# Patient Record
Sex: MALE | Race: WHITE | Employment: FULL TIME | URBAN - METROPOLITAN AREA
[De-identification: names, ages, dates, MRNs, and addresses within clinical notes are randomized per-mention and may not be internally consistent; named-entity substitution may affect disease eponyms.]

---

## 2017-01-08 ENCOUNTER — APPOINTMENT (OUTPATIENT)
Dept: GENERAL RADIOLOGY | Age: 32
End: 2017-01-08
Attending: NURSE PRACTITIONER
Payer: COMMERCIAL

## 2017-01-08 ENCOUNTER — HOSPITAL ENCOUNTER (OUTPATIENT)
Age: 32
Discharge: HOME | End: 2017-01-08
Payer: COMMERCIAL

## 2017-01-08 VITALS
RESPIRATION RATE: 16 BRPM | OXYGEN SATURATION: 98 % | DIASTOLIC BLOOD PRESSURE: 87 MMHG | HEIGHT: 74 IN | TEMPERATURE: 98 F | HEART RATE: 85 BPM | BODY MASS INDEX: 32.08 KG/M2 | WEIGHT: 250 LBS | SYSTOLIC BLOOD PRESSURE: 129 MMHG

## 2017-01-08 DIAGNOSIS — S46.911A SHOULDER STRAIN, RIGHT, INITIAL ENCOUNTER: Primary | ICD-10-CM

## 2017-01-08 DIAGNOSIS — H10.31 ACUTE BACTERIAL CONJUNCTIVITIS OF RIGHT EYE: ICD-10-CM

## 2017-01-08 PROCEDURE — 99214 OFFICE O/P EST MOD 30 MIN: CPT

## 2017-01-08 PROCEDURE — 99213 OFFICE O/P EST LOW 20 MIN: CPT

## 2017-01-08 PROCEDURE — 73030 X-RAY EXAM OF SHOULDER: CPT

## 2017-01-08 RX ORDER — TOBRAMYCIN 3 MG/ML
2 SOLUTION/ DROPS OPHTHALMIC EVERY 6 HOURS
Qty: 1 BOTTLE | Refills: 0 | Status: SHIPPED | OUTPATIENT
Start: 2017-01-08 | End: 2017-01-15

## 2017-01-08 NOTE — ED PROVIDER NOTES
Patient presents with: Eye Visual Problem (opthalmic)      HPI:     Hugh Pichardo is a 33year old male who presents today with a chief complaint of right eye redness and drainage that started yesterday.   Since he has had a one-week history of URI symp equivalent per week         Comment: socially    Drug Use: No    Sexual Activity: Not on file   Not on file  Other Topics Concern    Caffeine Concern Yes    Comment: coffee, 2 cups daily    Left Handed No    Right Handed Yes    Currently spends a great venancio Question: What is the Relevant Clinical Indication / Reason for Exam?  Answer: possible conjunctivitis, right shoulder pain  Tobramycin Sulfate 0.3 % Ophthalmic Solution   Sig: Place 2 drops into the right eye every 6 (six) hours.    Dispense:  1 Bottle   R

## 2017-03-02 RX ORDER — LANSOPRAZOLE 30 MG/1
CAPSULE, DELAYED RELEASE ORAL
Qty: 90 CAPSULE | Refills: 0 | Status: SHIPPED | OUTPATIENT
Start: 2017-03-02 | End: 2017-05-25

## 2017-05-25 RX ORDER — LANSOPRAZOLE 30 MG/1
CAPSULE, DELAYED RELEASE ORAL
Qty: 90 CAPSULE | Refills: 3 | Status: SHIPPED | OUTPATIENT
Start: 2017-05-25 | End: 2017-07-25 | Stop reason: ALTCHOICE

## 2017-07-19 ENCOUNTER — TELEPHONE (OUTPATIENT)
Dept: FAMILY MEDICINE CLINIC | Facility: CLINIC | Age: 32
End: 2017-07-19

## 2017-07-25 ENCOUNTER — OFFICE VISIT (OUTPATIENT)
Dept: FAMILY MEDICINE CLINIC | Facility: CLINIC | Age: 32
End: 2017-07-25

## 2017-07-25 VITALS
DIASTOLIC BLOOD PRESSURE: 84 MMHG | WEIGHT: 244 LBS | HEART RATE: 90 BPM | SYSTOLIC BLOOD PRESSURE: 129 MMHG | BODY MASS INDEX: 31.32 KG/M2 | HEIGHT: 74 IN

## 2017-07-25 DIAGNOSIS — L72.9 SCALP CYST: Primary | ICD-10-CM

## 2017-07-25 DIAGNOSIS — S46.002A INJURY OF LEFT ROTATOR CUFF, INITIAL ENCOUNTER: ICD-10-CM

## 2017-07-25 DIAGNOSIS — M79.641 RIGHT HAND PAIN: ICD-10-CM

## 2017-07-25 PROCEDURE — 99212 OFFICE O/P EST SF 10 MIN: CPT | Performed by: FAMILY MEDICINE

## 2017-07-25 PROCEDURE — 99213 OFFICE O/P EST LOW 20 MIN: CPT | Performed by: FAMILY MEDICINE

## 2017-07-25 RX ORDER — LIDOCAINE HYDROCHLORIDE 10 MG/ML
4 INJECTION, SOLUTION INFILTRATION; PERINEURAL ONCE
Status: DISCONTINUED | OUTPATIENT
Start: 2017-07-25 | End: 2017-11-21

## 2017-07-25 RX ORDER — TRIAMCINOLONE ACETONIDE 40 MG/ML
40 INJECTION, SUSPENSION INTRA-ARTICULAR; INTRAMUSCULAR ONCE
Status: DISCONTINUED | OUTPATIENT
Start: 2017-07-25 | End: 2017-11-29 | Stop reason: ALTCHOICE

## 2017-07-25 RX ORDER — BUPROPION HYDROCHLORIDE 150 MG/1
150 TABLET, EXTENDED RELEASE ORAL 2 TIMES DAILY
Qty: 60 TABLET | Refills: 2 | Status: SHIPPED | OUTPATIENT
Start: 2017-07-25 | End: 2017-10-28

## 2017-07-25 RX ORDER — NAPROXEN 500 MG/1
500 TABLET ORAL 2 TIMES DAILY WITH MEALS
Qty: 60 TABLET | Refills: 3 | Status: SHIPPED | OUTPATIENT
Start: 2017-07-25 | End: 2018-07-30

## 2017-07-25 NOTE — PROGRESS NOTES
Blood pressure 129/84, pulse 90, height 6' 2\" (1.88 m), weight 244 lb (110.7 kg). Complaining of a lump on his head. He has only had it for a few days. Denies trauma. Also with continued right hand pain.   He also reports that he has left shoulder pa

## 2017-07-25 NOTE — PROCEDURES
INFORMED CONSENT obtained all questions answered written consent obtained injection using aseptic technique left shoulder posterior approach Kenalog 40 mg/mL ×1 cc and lidocaine 1% ×4 mL patient tolerated procedure well immediate relief obtained

## 2017-08-22 ENCOUNTER — OFFICE VISIT (OUTPATIENT)
Dept: FAMILY MEDICINE CLINIC | Facility: CLINIC | Age: 32
End: 2017-08-22

## 2017-08-22 VITALS
HEIGHT: 74 IN | WEIGHT: 244 LBS | DIASTOLIC BLOOD PRESSURE: 77 MMHG | HEART RATE: 86 BPM | BODY MASS INDEX: 31.32 KG/M2 | SYSTOLIC BLOOD PRESSURE: 121 MMHG

## 2017-08-22 DIAGNOSIS — H53.8 BLURRED VISION, LEFT EYE: Primary | ICD-10-CM

## 2017-08-22 PROCEDURE — 99212 OFFICE O/P EST SF 10 MIN: CPT | Performed by: FAMILY MEDICINE

## 2017-08-22 PROCEDURE — 99213 OFFICE O/P EST LOW 20 MIN: CPT | Performed by: FAMILY MEDICINE

## 2017-08-22 NOTE — PROGRESS NOTES
Blood pressure 121/77, pulse 86, height 6' 2\" (1.88 m), weight 244 lb (110.7 kg). Patient presents today status post facial trauma 10 days ago. He was using a pair of clippers and hit himself in the face.   Reports he has been wearing his contact lenses

## 2017-10-30 RX ORDER — BUPROPION HYDROCHLORIDE 150 MG/1
TABLET, EXTENDED RELEASE ORAL
Qty: 180 TABLET | Refills: 3 | Status: SHIPPED | OUTPATIENT
Start: 2017-10-30 | End: 2018-05-22

## 2017-11-17 ENCOUNTER — APPOINTMENT (OUTPATIENT)
Dept: CT IMAGING | Facility: HOSPITAL | Age: 32
End: 2017-11-17
Attending: EMERGENCY MEDICINE
Payer: COMMERCIAL

## 2017-11-17 ENCOUNTER — HOSPITAL ENCOUNTER (EMERGENCY)
Facility: HOSPITAL | Age: 32
Discharge: HOME | End: 2017-11-17
Attending: EMERGENCY MEDICINE
Payer: COMMERCIAL

## 2017-11-17 VITALS
DIASTOLIC BLOOD PRESSURE: 88 MMHG | OXYGEN SATURATION: 96 % | RESPIRATION RATE: 20 BRPM | BODY MASS INDEX: 32.08 KG/M2 | SYSTOLIC BLOOD PRESSURE: 130 MMHG | HEIGHT: 74 IN | TEMPERATURE: 98 F | HEART RATE: 80 BPM | WEIGHT: 250 LBS

## 2017-11-17 DIAGNOSIS — N20.0 RIGHT NEPHROLITHIASIS: Primary | ICD-10-CM

## 2017-11-17 PROCEDURE — 74177 CT ABD & PELVIS W/CONTRAST: CPT | Performed by: EMERGENCY MEDICINE

## 2017-11-17 PROCEDURE — 96361 HYDRATE IV INFUSION ADD-ON: CPT

## 2017-11-17 PROCEDURE — 83690 ASSAY OF LIPASE: CPT | Performed by: EMERGENCY MEDICINE

## 2017-11-17 PROCEDURE — 85025 COMPLETE CBC W/AUTO DIFF WBC: CPT | Performed by: EMERGENCY MEDICINE

## 2017-11-17 PROCEDURE — 96376 TX/PRO/DX INJ SAME DRUG ADON: CPT

## 2017-11-17 PROCEDURE — 81001 URINALYSIS AUTO W/SCOPE: CPT | Performed by: EMERGENCY MEDICINE

## 2017-11-17 PROCEDURE — 96374 THER/PROPH/DIAG INJ IV PUSH: CPT

## 2017-11-17 PROCEDURE — 80053 COMPREHEN METABOLIC PANEL: CPT | Performed by: EMERGENCY MEDICINE

## 2017-11-17 PROCEDURE — 96375 TX/PRO/DX INJ NEW DRUG ADDON: CPT

## 2017-11-17 PROCEDURE — 99284 EMERGENCY DEPT VISIT MOD MDM: CPT

## 2017-11-17 RX ORDER — ONDANSETRON 4 MG/1
4 TABLET, ORALLY DISINTEGRATING ORAL EVERY 8 HOURS PRN
Qty: 15 TABLET | Refills: 0 | Status: SHIPPED | OUTPATIENT
Start: 2017-11-17 | End: 2017-11-21

## 2017-11-17 RX ORDER — TRAMADOL HYDROCHLORIDE 50 MG/1
50 TABLET ORAL EVERY 8 HOURS PRN
Qty: 15 TABLET | Refills: 0 | Status: SHIPPED | OUTPATIENT
Start: 2017-11-17 | End: 2017-11-21

## 2017-11-17 RX ORDER — ALFUZOSIN HYDROCHLORIDE 10 MG/1
10 TABLET, EXTENDED RELEASE ORAL ONCE
Status: COMPLETED | OUTPATIENT
Start: 2017-11-17 | End: 2017-11-17

## 2017-11-17 RX ORDER — MORPHINE SULFATE 4 MG/ML
4 INJECTION, SOLUTION INTRAMUSCULAR; INTRAVENOUS ONCE
Status: COMPLETED | OUTPATIENT
Start: 2017-11-17 | End: 2017-11-17

## 2017-11-17 RX ORDER — MELOXICAM 7.5 MG/1
7.5 TABLET ORAL DAILY
Qty: 14 TABLET | Refills: 0 | Status: SHIPPED | OUTPATIENT
Start: 2017-11-17 | End: 2017-11-21

## 2017-11-17 RX ORDER — KETOROLAC TROMETHAMINE 30 MG/ML
30 INJECTION, SOLUTION INTRAMUSCULAR; INTRAVENOUS ONCE
Status: COMPLETED | OUTPATIENT
Start: 2017-11-17 | End: 2017-11-17

## 2017-11-17 RX ORDER — ONDANSETRON 2 MG/ML
4 INJECTION INTRAMUSCULAR; INTRAVENOUS ONCE
Status: DISCONTINUED | OUTPATIENT
Start: 2017-11-17 | End: 2017-11-17

## 2017-11-17 RX ORDER — ONDANSETRON 2 MG/ML
4 INJECTION INTRAMUSCULAR; INTRAVENOUS ONCE
Status: COMPLETED | OUTPATIENT
Start: 2017-11-17 | End: 2017-11-17

## 2017-11-17 RX ORDER — TAMSULOSIN HYDROCHLORIDE 0.4 MG/1
0.4 CAPSULE ORAL DAILY
Qty: 14 CAPSULE | Refills: 0 | Status: SHIPPED | OUTPATIENT
Start: 2017-11-17 | End: 2017-11-27

## 2017-11-17 NOTE — ED INITIAL ASSESSMENT (HPI)
Patient complains of R sided abd pain x3 days, states he is \"gassy\", states he is nauseous, states pain radiates to R flank at times

## 2017-11-18 NOTE — ED PROVIDER NOTES
Patient Seen in: [facility] AND [facility] Emergency Department    History   Patient presents with:  Abdomen/Flank Pain (GI/)    Stated Complaint: ABD Pain     HPI    26 yo M without PMH/PSH presenting with three days of sharp/cramping right sided abdominal disc Positive for stated complaint: ABD Pain  Other systems are as noted in HPI. Constitutional and vital signs reviewed. All other systems reviewed and negative except as noted above.     PSFH elements reviewed from today and agreed except as otherwis Pelvis Iv Contrast, No Oral (er)    Result Date: 11/17/2017  PROCEDURE: CT ABDOMEN PELVIS IV CONTRAST NO ORAL (ER)  COMPARISON: [facility], Bennie Yasmany 12, 8/31/2014, 15:28. INDICATIONS: RLQ abdomen pain x3 days.   TECHNIQUE: CT imag 13, 2012. 3. Normal appendix. MDM     DIFFERENTIAL DIAGNOSIS: After history and physical exam differential diagnosis was considered for appendicitis, biliary colic, renal colic, colitis, pyelonephritis.     Pulse ox: 98%:Normal on RA, as interpreted

## 2017-11-21 ENCOUNTER — HOSPITAL ENCOUNTER (OUTPATIENT)
Dept: GENERAL RADIOLOGY | Facility: HOSPITAL | Age: 32
Discharge: HOME | End: 2017-11-21
Attending: UROLOGY
Payer: COMMERCIAL

## 2017-11-21 ENCOUNTER — OFFICE VISIT (OUTPATIENT)
Dept: FAMILY MEDICINE CLINIC | Facility: CLINIC | Age: 32
End: 2017-11-21

## 2017-11-21 ENCOUNTER — OFFICE VISIT (OUTPATIENT)
Dept: SURGERY | Facility: CLINIC | Age: 32
End: 2017-11-21

## 2017-11-21 VITALS
TEMPERATURE: 98 F | HEART RATE: 91 BPM | SYSTOLIC BLOOD PRESSURE: 147 MMHG | BODY MASS INDEX: 32.32 KG/M2 | HEIGHT: 74 IN | WEIGHT: 251.81 LBS | DIASTOLIC BLOOD PRESSURE: 83 MMHG

## 2017-11-21 VITALS
BODY MASS INDEX: 32.08 KG/M2 | SYSTOLIC BLOOD PRESSURE: 135 MMHG | TEMPERATURE: 98 F | WEIGHT: 250 LBS | HEIGHT: 74 IN | HEART RATE: 85 BPM | DIASTOLIC BLOOD PRESSURE: 92 MMHG

## 2017-11-21 DIAGNOSIS — N20.0 KIDNEY STONE: ICD-10-CM

## 2017-11-21 DIAGNOSIS — N20.1 URETERAL STONE: ICD-10-CM

## 2017-11-21 DIAGNOSIS — N13.2 HYDRONEPHROSIS WITH URINARY OBSTRUCTION DUE TO URETERAL CALCULUS: ICD-10-CM

## 2017-11-21 DIAGNOSIS — N20.1 URETERAL STONE: Primary | ICD-10-CM

## 2017-11-21 DIAGNOSIS — N20.0 NEPHROLITHIASIS: Primary | ICD-10-CM

## 2017-11-21 DIAGNOSIS — R31.29 MICROHEMATURIA: ICD-10-CM

## 2017-11-21 DIAGNOSIS — N23 RENAL COLIC ON RIGHT SIDE: ICD-10-CM

## 2017-11-21 PROCEDURE — 99212 OFFICE O/P EST SF 10 MIN: CPT | Performed by: UROLOGY

## 2017-11-21 PROCEDURE — 99213 OFFICE O/P EST LOW 20 MIN: CPT | Performed by: FAMILY MEDICINE

## 2017-11-21 PROCEDURE — 99212 OFFICE O/P EST SF 10 MIN: CPT | Performed by: FAMILY MEDICINE

## 2017-11-21 PROCEDURE — 99244 OFF/OP CNSLTJ NEW/EST MOD 40: CPT | Performed by: UROLOGY

## 2017-11-21 PROCEDURE — 74020 XR ABDOMEN MINIMUM 2 VIEWS (CPT=74020): CPT | Performed by: UROLOGY

## 2017-11-21 RX ORDER — SODIUM CHLORIDE, SODIUM LACTATE, POTASSIUM CHLORIDE, CALCIUM CHLORIDE 600; 310; 30; 20 MG/100ML; MG/100ML; MG/100ML; MG/100ML
INJECTION, SOLUTION INTRAVENOUS CONTINUOUS
Status: CANCELLED | OUTPATIENT
Start: 2017-11-21

## 2017-11-21 RX ORDER — ONDANSETRON 4 MG/1
TABLET, ORALLY DISINTEGRATING ORAL
Qty: 25 TABLET | Refills: 0 | Status: SHIPPED | OUTPATIENT
Start: 2017-11-21 | End: 2017-12-12

## 2017-11-21 RX ORDER — HYDROCODONE BITARTRATE AND ACETAMINOPHEN 5; 325 MG/1; MG/1
TABLET ORAL
Qty: 30 TABLET | Refills: 0 | Status: SHIPPED | OUTPATIENT
Start: 2017-11-21 | End: 2017-12-12

## 2017-11-21 RX ORDER — MELOXICAM 7.5 MG/1
7.5 TABLET ORAL DAILY
Qty: 14 TABLET | Refills: 0 | OUTPATIENT
Start: 2017-11-21 | End: 2017-12-05

## 2017-11-21 NOTE — PROGRESS NOTES
Blood pressure 147/83, pulse 91, temperature 98.1 °F (36.7 °C), temperature source Oral, height 6' 2\" (1.88 m), weight 251 lb 12.8 oz (114.2 kg). Following up for nephrolithiasis that has been bothering him for 1 week.   Seen in the emergency department

## 2017-11-21 NOTE — PROGRESS NOTES
Isreal Jiang is a 27year old male. HPI:   Patient presents with:  Kidney Problem: Right flank pain. Onset 11/14/17. Current pain level 10/10, constant sharp/stabbing pain. Pain is causing nausea, vomiting and loss of appetite.  Extreme buring sensat Review of previous records: Urological hx: Pt states he has passed 4-5 stones on his own. Pt has seen Dr. Grant Pollack before, passed the kidney stone.  Pt states he had a 8 mm stone in 2005, where he had a cystoscopy and  and had ESWL at [facility]; pt state Meloxicam 7.5 MG Oral Tab Take 1 tablet (7.5 mg total) by mouth daily. Avoid motrin(ibuprofen) and aleve(naproxen) while taking this medication.  Disp: 14 tablet Rfl: 0   HYDROcodone-acetaminophen (NORCO) 5-325 MG Oral Tab Take 1-2 tabs every 4-6 hours as n Respiratory:  Negative for cough, dyspnea and wheezing      PHYSICAL EXAM:   Constitutional: appears well hydrated alert and responsive no acute distress noted  Neurological: Oriented to time, place, person with normal affect  Exam appropriate for age; pat Plan: 11/17/17: Ct Abdomen Pelvis Iv Contrast: KIDNEYS 0.42 cm mildly obstructing stone in the mid right ureter.  Discussed treatment options including  KUB plain XR today and cystoscopy, right retrograde pyelogram x-ray, insertion of right ureteral stent, (D80) Renal colic on right side  Plan: Pt describes pain to be 10/10 and describes it to be excruciatingly sharp; pt states Tramadol and Meloxicam provided no relief; please see ureteral stone above.     (R31.29) Microhematuria  Plan: 11/17/17: WBC: 1; RBC 9.  In the near future, consider shockwave lithotripsy of the nonobstructing stone in the other (contralateral) left kidney--on the other side    10.   After you have completed all surgery, we will likely want to to submit metabolic 24 urine collection stud

## 2017-11-22 ENCOUNTER — SURGERY (OUTPATIENT)
Age: 32
End: 2017-11-22

## 2017-11-22 ENCOUNTER — TELEPHONE (OUTPATIENT)
Dept: SURGERY | Facility: CLINIC | Age: 32
End: 2017-11-22

## 2017-11-22 ENCOUNTER — APPOINTMENT (OUTPATIENT)
Dept: GENERAL RADIOLOGY | Facility: HOSPITAL | Age: 32
End: 2017-11-22
Attending: UROLOGY
Payer: COMMERCIAL

## 2017-11-22 ENCOUNTER — HOSPITAL ENCOUNTER (OUTPATIENT)
Facility: HOSPITAL | Age: 32
Setting detail: HOSPITAL OUTPATIENT SURGERY
Discharge: HOME | End: 2017-11-22
Attending: UROLOGY | Admitting: UROLOGY
Payer: COMMERCIAL

## 2017-11-22 ENCOUNTER — ANESTHESIA (OUTPATIENT)
Dept: SURGERY | Facility: HOSPITAL | Age: 32
End: 2017-11-22
Payer: COMMERCIAL

## 2017-11-22 ENCOUNTER — ANESTHESIA EVENT (OUTPATIENT)
Dept: SURGERY | Facility: HOSPITAL | Age: 32
End: 2017-11-22
Payer: COMMERCIAL

## 2017-11-22 VITALS
OXYGEN SATURATION: 98 % | SYSTOLIC BLOOD PRESSURE: 140 MMHG | RESPIRATION RATE: 18 BRPM | WEIGHT: 245.31 LBS | TEMPERATURE: 97 F | DIASTOLIC BLOOD PRESSURE: 81 MMHG | HEIGHT: 74 IN | HEART RATE: 68 BPM | BODY MASS INDEX: 31.48 KG/M2

## 2017-11-22 DIAGNOSIS — N20.1 RIGHT URETERAL STONE: ICD-10-CM

## 2017-11-22 DIAGNOSIS — R10.9 LEFT FLANK PAIN: Primary | ICD-10-CM

## 2017-11-22 DIAGNOSIS — N20.1 URETERAL STONE: Primary | ICD-10-CM

## 2017-11-22 PROCEDURE — 74420 UROGRAPHY RTRGR +-KUB: CPT | Performed by: UROLOGY

## 2017-11-22 PROCEDURE — 0T768DZ DILATION OF RIGHT URETER WITH INTRALUMINAL DEVICE, VIA NATURAL OR ARTIFICIAL OPENING ENDOSCOPIC: ICD-10-PCS | Performed by: UROLOGY

## 2017-11-22 PROCEDURE — 52332 CYSTOSCOPY AND TREATMENT: CPT | Performed by: UROLOGY

## 2017-11-22 DEVICE — STENT URET 6F 26CM WO GW INL: Type: IMPLANTABLE DEVICE | Site: URETER | Status: FUNCTIONAL

## 2017-11-22 RX ORDER — LIDOCAINE HYDROCHLORIDE 20 MG/ML
JELLY TOPICAL AS NEEDED
Status: DISCONTINUED | OUTPATIENT
Start: 2017-11-22 | End: 2017-11-22 | Stop reason: HOSPADM

## 2017-11-22 RX ORDER — FAMOTIDINE 20 MG/1
20 TABLET ORAL ONCE
Status: DISCONTINUED | OUTPATIENT
Start: 2017-11-22 | End: 2017-11-22 | Stop reason: HOSPADM

## 2017-11-22 RX ORDER — PHENAZOPYRIDINE HYDROCHLORIDE 200 MG/1
200 TABLET, FILM COATED ORAL 3 TIMES DAILY PRN
Qty: 30 TABLET | Refills: 1 | Status: SHIPPED | OUTPATIENT
Start: 2017-11-22 | End: 2018-07-30

## 2017-11-22 RX ORDER — SODIUM CHLORIDE, SODIUM LACTATE, POTASSIUM CHLORIDE, CALCIUM CHLORIDE 600; 310; 30; 20 MG/100ML; MG/100ML; MG/100ML; MG/100ML
INJECTION, SOLUTION INTRAVENOUS CONTINUOUS
Status: DISCONTINUED | OUTPATIENT
Start: 2017-11-22 | End: 2017-11-22

## 2017-11-22 RX ORDER — HYDROCODONE BITARTRATE AND ACETAMINOPHEN 5; 325 MG/1; MG/1
2 TABLET ORAL AS NEEDED
Status: DISCONTINUED | OUTPATIENT
Start: 2017-11-22 | End: 2017-11-22

## 2017-11-22 RX ORDER — ONDANSETRON 2 MG/ML
4 INJECTION INTRAMUSCULAR; INTRAVENOUS ONCE AS NEEDED
Status: DISCONTINUED | OUTPATIENT
Start: 2017-11-22 | End: 2017-11-22

## 2017-11-22 RX ORDER — MORPHINE SULFATE 10 MG/ML
6 INJECTION, SOLUTION INTRAMUSCULAR; INTRAVENOUS EVERY 10 MIN PRN
Status: DISCONTINUED | OUTPATIENT
Start: 2017-11-22 | End: 2017-11-22

## 2017-11-22 RX ORDER — HYDROMORPHONE HYDROCHLORIDE 1 MG/ML
0.4 INJECTION, SOLUTION INTRAMUSCULAR; INTRAVENOUS; SUBCUTANEOUS EVERY 5 MIN PRN
Status: DISCONTINUED | OUTPATIENT
Start: 2017-11-22 | End: 2017-11-22

## 2017-11-22 RX ORDER — HYDROCODONE BITARTRATE AND ACETAMINOPHEN 5; 325 MG/1; MG/1
2 TABLET ORAL EVERY 4 HOURS PRN
Status: DISCONTINUED | OUTPATIENT
Start: 2017-11-22 | End: 2017-11-22

## 2017-11-22 RX ORDER — HYDROMORPHONE HYDROCHLORIDE 1 MG/ML
0.2 INJECTION, SOLUTION INTRAMUSCULAR; INTRAVENOUS; SUBCUTANEOUS EVERY 5 MIN PRN
Status: DISCONTINUED | OUTPATIENT
Start: 2017-11-22 | End: 2017-11-22

## 2017-11-22 RX ORDER — GENTAMICIN SULFATE 40 MG/ML
INJECTION, SOLUTION INTRAMUSCULAR; INTRAVENOUS AS NEEDED
Status: DISCONTINUED | OUTPATIENT
Start: 2017-11-22 | End: 2017-11-22 | Stop reason: HOSPADM

## 2017-11-22 RX ORDER — SODIUM CHLORIDE 9 MG/ML
INJECTION, SOLUTION INTRAVENOUS CONTINUOUS
Status: DISCONTINUED | OUTPATIENT
Start: 2017-11-22 | End: 2017-11-22

## 2017-11-22 RX ORDER — CEFADROXIL 500 MG/1
CAPSULE ORAL
Qty: 14 CAPSULE | Refills: 1 | Status: SHIPPED | OUTPATIENT
Start: 2017-11-22 | End: 2017-11-29 | Stop reason: ALTCHOICE

## 2017-11-22 RX ORDER — PHENAZOPYRIDINE HYDROCHLORIDE 200 MG/1
200 TABLET, FILM COATED ORAL ONCE AS NEEDED
Status: DISCONTINUED | OUTPATIENT
Start: 2017-11-22 | End: 2017-11-22

## 2017-11-22 RX ORDER — MORPHINE SULFATE 4 MG/ML
4 INJECTION, SOLUTION INTRAMUSCULAR; INTRAVENOUS EVERY 10 MIN PRN
Status: DISCONTINUED | OUTPATIENT
Start: 2017-11-22 | End: 2017-11-22

## 2017-11-22 RX ORDER — HYDROCODONE BITARTRATE AND ACETAMINOPHEN 5; 325 MG/1; MG/1
1 TABLET ORAL AS NEEDED
Status: DISCONTINUED | OUTPATIENT
Start: 2017-11-22 | End: 2017-11-22

## 2017-11-22 RX ORDER — HYDROCODONE BITARTRATE AND ACETAMINOPHEN 5; 325 MG/1; MG/1
1 TABLET ORAL EVERY 4 HOURS PRN
Status: DISCONTINUED | OUTPATIENT
Start: 2017-11-22 | End: 2017-11-22

## 2017-11-22 RX ORDER — HALOPERIDOL 5 MG/ML
0.25 INJECTION INTRAMUSCULAR ONCE AS NEEDED
Status: DISCONTINUED | OUTPATIENT
Start: 2017-11-22 | End: 2017-11-22

## 2017-11-22 RX ORDER — NALOXONE HYDROCHLORIDE 0.4 MG/ML
80 INJECTION, SOLUTION INTRAMUSCULAR; INTRAVENOUS; SUBCUTANEOUS AS NEEDED
Status: DISCONTINUED | OUTPATIENT
Start: 2017-11-22 | End: 2017-11-22

## 2017-11-22 RX ORDER — HYDROMORPHONE HYDROCHLORIDE 1 MG/ML
0.6 INJECTION, SOLUTION INTRAMUSCULAR; INTRAVENOUS; SUBCUTANEOUS EVERY 5 MIN PRN
Status: DISCONTINUED | OUTPATIENT
Start: 2017-11-22 | End: 2017-11-22

## 2017-11-22 RX ORDER — MIDAZOLAM HYDROCHLORIDE 1 MG/ML
INJECTION INTRAMUSCULAR; INTRAVENOUS AS NEEDED
Status: DISCONTINUED | OUTPATIENT
Start: 2017-11-22 | End: 2017-11-22 | Stop reason: SURG

## 2017-11-22 RX ORDER — AMITRIPTYLINE HYDROCHLORIDE 25 MG/1
TABLET, FILM COATED ORAL
Qty: 20 TABLET | Refills: 1 | Status: SHIPPED | OUTPATIENT
Start: 2017-11-22 | End: 2017-12-05

## 2017-11-22 RX ORDER — METOCLOPRAMIDE 10 MG/1
10 TABLET ORAL ONCE
Status: DISCONTINUED | OUTPATIENT
Start: 2017-11-22 | End: 2017-11-22 | Stop reason: HOSPADM

## 2017-11-22 RX ORDER — MORPHINE SULFATE 2 MG/ML
2 INJECTION, SOLUTION INTRAMUSCULAR; INTRAVENOUS EVERY 10 MIN PRN
Status: DISCONTINUED | OUTPATIENT
Start: 2017-11-22 | End: 2017-11-22

## 2017-11-22 RX ORDER — ACETAMINOPHEN 325 MG/1
650 TABLET ORAL EVERY 4 HOURS PRN
Status: DISCONTINUED | OUTPATIENT
Start: 2017-11-22 | End: 2017-11-22

## 2017-11-22 RX ORDER — ACETAMINOPHEN 500 MG
1000 TABLET ORAL ONCE
Status: COMPLETED | OUTPATIENT
Start: 2017-11-22 | End: 2017-11-22

## 2017-11-22 RX ORDER — LIDOCAINE HYDROCHLORIDE 10 MG/ML
INJECTION, SOLUTION EPIDURAL; INFILTRATION; INTRACAUDAL; PERINEURAL AS NEEDED
Status: DISCONTINUED | OUTPATIENT
Start: 2017-11-22 | End: 2017-11-22 | Stop reason: SURG

## 2017-11-22 RX ADMIN — SODIUM CHLORIDE: 9 INJECTION, SOLUTION INTRAVENOUS at 17:50:00

## 2017-11-22 RX ADMIN — SODIUM CHLORIDE: 9 INJECTION, SOLUTION INTRAVENOUS at 17:38:00

## 2017-11-22 RX ADMIN — LIDOCAINE HYDROCHLORIDE 50 MG: 10 INJECTION, SOLUTION EPIDURAL; INFILTRATION; INTRACAUDAL; PERINEURAL at 17:38:00

## 2017-11-22 RX ADMIN — MIDAZOLAM HYDROCHLORIDE 2 MG: 1 INJECTION INTRAMUSCULAR; INTRAVENOUS at 17:35:00

## 2017-11-22 NOTE — PATIENT INSTRUCTIONS
1.  Continue tamsulosin 0.4 mg daily to help you pass the stone    2. Continue ondansetron for nausea and vomiting--ordered by him is tomorrow [facility] emergency room    3.   Please take the hydrocodone/Norco for severe pain--ordered by Dr. Coby Rodriguez back and nausea/vomiting that you have are because of a small stone that has formed in the kidney. It is now passing down a narrow tube (ureter) on its way to your bladder. Once the stone reaches your bladder, the pain will often stop.  But it may come back provider to look at. It may be possible to stop certain types of stones from forming. For this reason, it is important to know what kind of stone you have. · Try to stay as active as possible. This will help the stone pass.  Don't stay in bed unless your p calcium intake may raise your risk. New research shows that eating calcium-rich and oxalate-rich foods together lowers your risk for stones by binding the minerals in the stomach and intestines before they can reach the kidneys.    · Limit salt intake to 2 pass urine for 8 hours and increasing bladder pressure  Date Last Reviewed: 10/1/2016  © 5780-3432 [employer]. [address] [facility], [address] [place] [address]. All rights reserved.  This information is not intended as a substitute for professional

## 2017-11-22 NOTE — H&P
Massiel Garcia MD   UROLOGY      []Manual[]Template  []Copied  Delice Prader is a 27year old male.     HPI:   Patient presents with:  Kidney Problem: Right flank pain. Onset 11/14/17. Current pain level 10/10, constant sharp/stabbing pain.  Pain is Review of previous records: Urological hx: Pt states he has passed 4-5 stones on his own. Pt has seen Dr. Annika Posada before, passed the kidney stone.  Pt states he had a 8 mm stone in 2005, where he had a cystoscopy and  and had ESWL at [facility]; pt state Meloxicam 7.5 MG Oral Tab Take 1 tablet (7.5 mg total) by mouth daily. Avoid motrin(ibuprofen) and aleve(naproxen) while taking this medication.  Disp: 14 tablet Rfl: 0   HYDROcodone-acetaminophen (NORCO) 5-325 MG Oral Tab Take 1-2 tabs every 4-6 hours as n Respiratory:  Negative for cough, dyspnea and wheezing        PHYSICAL EXAM:   Constitutional: appears well hydrated alert and responsive no acute distress noted  Neurological: Oriented to time, place, person with normal affect  Exam appropriate for age; p Plan: 11/17/17: Ct Abdomen Pelvis Iv Contrast: KIDNEYS 0.42 cm mildly obstructing stone in the mid right ureter.  Discussed treatment options including  KUB plain XR today and cystoscopy, right retrograde pyelogram x-ray, insertion of right ureteral stent, (Y84) Renal colic on right side  Plan: Pt describes pain to be 10/10 and describes it to be excruciatingly sharp; pt states Tramadol and Meloxicam provided no relief; please see ureteral stone above.      (R31.29) Microhematuria  Plan: 11/17/17: WBC: 1; RB 9.  In the near future, consider shockwave lithotripsy of the nonobstructing stone in the other (contralateral) left kidney--on the other side     10.   After you have completed all surgery, we will likely want to to submit metabolic 24 urine collection jam

## 2017-11-22 NOTE — TELEPHONE ENCOUNTER
Patient seen in office scheduled for cysto,right retrograde pyelogram, possible stone manipulation, right stent insertion. Scheduled for Wednesday @ 5:30 at [facility]/outpatient.     Spoke with [employer] rep to obtain prior authorization informe

## 2017-11-22 NOTE — INTERVAL H&P NOTE
Pre-op Diagnosis: Right ureteral stone [N20.1]    KUB plain x-ray 11/21/17 immediately after the visit was read by radiologist as showing right ureteral stone in the same approximate location where stone was on initial CT scan indicating that the stone had

## 2017-11-22 NOTE — TELEPHONE ENCOUNTER
I informed PVK of pt's wishes and about his pain as indicated in my msg below and he stated that he will then move forward with pt's procedure this evening and that I should also inform Rashmi Newman, the surgery schdr. of this and I did.

## 2017-11-22 NOTE — TELEPHONE ENCOUNTER
Please call patient ASAP and find out if he is stable and find out if we can definitively cancel his procedure which was tentatively scheduled for late afternoon/evening today; please physically interrupt me with his response; with respect to his KUB, I se

## 2017-11-22 NOTE — TELEPHONE ENCOUNTER
I spoke with pt and determined that he does not want to cancel the surgery and he wants to move forward with having the stent placed. He states that he took a Norco at 4am this morning and he still has pain in the Rt. Flank at level 9-10.  States that his u

## 2017-11-22 NOTE — ANESTHESIA PREPROCEDURE EVALUATION
Anesthesia PreOp Note    HPI:     Sanjay Verma is a 27year old male who presents for preoperative consultation requested by: Babatunde Hanson MD    Date of Surgery: 11/22/2017    Procedure(s):  CYSTOSCOPY RETROGRADE  CYSTOSCOPY STENT INSERTION  Olivia (two) times daily with meals. Disp: 60 tablet Rfl: 3 11/14/2017   lansoprazole (PREVACID) 30 MG Oral Capsule Delayed Release Take 1 capsule (30 mg total) by mouth 2 (two) times daily.  Disp: 30 capsule Rfl: 6 11/22/2017 at 0400   Multiple Vitamin (MULTI-VIT None on file       Available pre-op labs reviewed.     Lab Results  Component Value Date   WBC 7.9 11/17/2017   RBC 4.95 11/17/2017   HGB 15.2 11/17/2017   HCT 44.8 11/17/2017   MCV 90.5 11/17/2017   MCH 30.6 11/17/2017   MCHC 33.8 11/17/2017   RDW 13.2 1 Lanie Hernandez  11/22/2017 5:16 PM

## 2017-11-23 NOTE — BRIEF OP NOTE
[facility] POST ANESTHESIA CARE UNIT  Brief Op Note       Patients Name: Jessicailla Landau  Attending Physician: Jeanette Gonzalez MD  Operating Physician: Olya Goins MD  CSN: [id number]     Location:  OR  MRN: [id number]    Date of Birth: [birth year]

## 2017-11-23 NOTE — TELEPHONE ENCOUNTER
Morenita,    Please schedule patient for cystoscopy, right retrograde pyelogram x-ray, dilation of right ureter, right ureteroscopy with holmium laser lithotripsy of stone, possible basket extraction of stone or stone fragments, replacement of right ureteral surgery  9. My partner Dr. Angela Harris is on call for me for the next 4 days  10.   If you experience severe discomfort from the right ureteral stent, I am issuing a paper prescription for amitriptyline 25 mg (low-dose) 1 hour before bedtime; if stent pain is n

## 2017-11-23 NOTE — OPERATIVE REPORT
[facility]    PATIENT'S NAME: Jorje Lalita   ATTENDING PHYSICIAN: Rosa Cheng MD   OPERATING PHYSICIAN: Rosa Cheng MD   PATIENT ACCOUNT#:   [id number]    LOCATION:  [address]  MEDICAL RECORD #:   [id number] November 17, 2017. The patient wanted to proceed with surgery this evening.   This evening, in Same Day Surgery, I discussed and explained and answered questions about the benefits and risks of cystoscopy, right retrograde pyelogram, right stent insertion, 0.035 Bard Glidewire into the right kidney, and then I positioned a 26 cm, 6-Faroese Bard Box Elder stent and positioned it so a good coil formed in the right kidney and endoscopically witnessed how a good coil formed in the bladder.   There was no bleeding at

## 2017-11-23 NOTE — ANESTHESIA POSTPROCEDURE EVALUATION
Patient: Memo Toth    Procedure Summary     Date:  11/22/17 Room / Location:  [address] [place] [address] MAIN OR 14 / [address] [place] [address] MAIN OR    Anesthesia Start:  1392 Anesthesia Stop:  6321    Procedures:       CYSTOSCOPY RETROGRADE (Right )      CYSTOSCOPY STENT INSERTION (Right )

## 2017-11-23 NOTE — TELEPHONE ENCOUNTER
Dear Yecenia Kirkland last night for renal colic from right ureteral stone; by the end of the consult, patient thought he would try to pass stone on his own; today pain all day and changed his mind and wanted to proceed and we performed cystoscopy with right stent insertion, [facility], same-day surgery; plan is for him to go home this evening. Either next week or the week after plan for cystoscopy, right ureteroscopy with laser lithotripsy of stone and/or basket extraction, replacement of right ureteral stent, same-day surgery, [facility]. I will keep you posted.   Many thanks, Jasvir Gutierrez,

## 2017-11-24 NOTE — TELEPHONE ENCOUNTER
Eugene Landry per your request patient is scheduled for Tuesday 12/5/17 @ 7:30, patient is aware. Patient would like Tuesday 11/28/17. I can will get patient scheduled on Monday, once we have a chance to go over schedule, providing we can accommodate.  Pl

## 2017-11-27 ENCOUNTER — HOSPITAL ENCOUNTER (OUTPATIENT)
Dept: GENERAL RADIOLOGY | Facility: HOSPITAL | Age: 32
Discharge: HOME | End: 2017-11-27
Attending: UROLOGY
Payer: COMMERCIAL

## 2017-11-27 ENCOUNTER — HOSPITAL ENCOUNTER (OUTPATIENT)
Dept: ULTRASOUND IMAGING | Facility: HOSPITAL | Age: 32
Discharge: HOME | End: 2017-11-27
Attending: UROLOGY
Payer: COMMERCIAL

## 2017-11-27 DIAGNOSIS — N20.1 RIGHT URETERAL STONE: ICD-10-CM

## 2017-11-27 DIAGNOSIS — R10.9 LEFT FLANK PAIN: ICD-10-CM

## 2017-11-27 PROCEDURE — 76770 US EXAM ABDO BACK WALL COMP: CPT | Performed by: UROLOGY

## 2017-11-27 PROCEDURE — 74020 XR ABDOMEN MINIMUM 2 VIEWS (CPT=74020): CPT | Performed by: UROLOGY

## 2017-11-27 RX ORDER — TAMSULOSIN HYDROCHLORIDE 0.4 MG/1
CAPSULE ORAL
Qty: 30 CAPSULE | Refills: 0 | Status: SHIPPED | OUTPATIENT
Start: 2017-11-27 | End: 2017-12-24

## 2017-11-27 RX ORDER — HYDROCODONE BITARTRATE AND ACETAMINOPHEN 5; 325 MG/1; MG/1
1 TABLET ORAL EVERY 6 HOURS PRN
Qty: 15 TABLET | Refills: 0 | Status: SHIPPED | OUTPATIENT
Start: 2017-11-27 | End: 2017-12-07

## 2017-11-27 NOTE — TELEPHONE ENCOUNTER
Relayed Dr. Marcela Montanez' message below to patient. Patient verbalized understanding and states he will comply. Hydrocodone Rx given to patient. Dr. Marcela Montanez, will you refill tamsulosin? If you agree, medication is pended.

## 2017-11-27 NOTE — TELEPHONE ENCOUNTER
Patient contacted. Patient states he is at office since he just finished his X-Ray. Patient c/o right flank pain, hematuria and new onset left flank pain; onset 2 days ago.  Patient describes left flank pain as sharp, stabbing, rating pain 8/10, states come

## 2017-11-27 NOTE — TELEPHONE ENCOUNTER
Nurses, please call patient and find out where his pain is, what side the pain is on, nature of the pain, severity, if he is taking anything for the pain, is anything making the pain better or worse.   Please be aware that he has a ureteral stent, and was s

## 2017-11-27 NOTE — TELEPHONE ENCOUNTER
Spoke with patient informed him of getting stat KUB and Ultrasound, patient will have it done today. Patient stated that he is in pain. Asking  to prescribe something for the pain. Please advise.      Orion Orona could you please route to th

## 2017-11-27 NOTE — TELEPHONE ENCOUNTER
I agree; please check notes that a left you Wednesday night before Thanksgiving concerning which surgeries need to be done on 11/28/17 and please interrupt me to discuss.   Thanks, Dr. María Arshad

## 2017-11-27 NOTE — TELEPHONE ENCOUNTER
Kendell Sutton, not   please call patient back; since he has a new complaint of left flank pain, he needs to get stat kidney ultrasound and KUB and to call us 1-2 hours afterwards for the results and we will take it from there.   Thanks, Dr. Cyndee Sands

## 2017-11-27 NOTE — TELEPHONE ENCOUNTER
Please notify patient ---  I reviewed the KUB plain x-ray just had and I do not see any obvious stone on the left side to account for left-sided pain but await official interpretation by radiologist (x-ray specialist).   We want him to try to avoid taking n

## 2017-11-27 NOTE — TELEPHONE ENCOUNTER
Lamont Alejo, spoke with patient to confirm procedure for 12/05/17, patient states he is in pain and doesn't want to want until next week. Patient also states that now the left side is hurting as well please advise.  Thanks [employer]

## 2017-11-28 NOTE — TELEPHONE ENCOUNTER
Patient contacted. Relayed Dr. Barbie Mares' message below to patient. Patient is aware of his KUB and renal US results and verbalized understanding. All questions answered.

## 2017-11-28 NOTE — TELEPHONE ENCOUNTER
Please call patient. KUB plain x-ray today was read by radiologist and it does not show any obvious kidney stones although there was stool in the colon which could have hidden a stone in the kidney. On KUB plain x-ray, the stent is in very good position.

## 2017-11-29 RX ORDER — SODIUM CHLORIDE, SODIUM LACTATE, POTASSIUM CHLORIDE, CALCIUM CHLORIDE 600; 310; 30; 20 MG/100ML; MG/100ML; MG/100ML; MG/100ML
INJECTION, SOLUTION INTRAVENOUS CONTINUOUS
Status: CANCELLED | OUTPATIENT
Start: 2017-11-29

## 2017-11-30 ENCOUNTER — TELEPHONE (OUTPATIENT)
Dept: SURGERY | Facility: CLINIC | Age: 32
End: 2017-11-30

## 2017-11-30 RX ORDER — AMITRIPTYLINE HYDROCHLORIDE 25 MG/1
TABLET, FILM COATED ORAL
Qty: 20 TABLET | Refills: 1 | Status: SHIPPED | OUTPATIENT
Start: 2017-11-30 | End: 2017-12-05

## 2017-11-30 RX ORDER — GABAPENTIN 300 MG/1
300 CAPSULE ORAL 3 TIMES DAILY
Qty: 90 CAPSULE | Refills: 0 | Status: SHIPPED | OUTPATIENT
Start: 2017-11-30 | End: 2018-05-22 | Stop reason: ALTCHOICE

## 2017-11-30 NOTE — TELEPHONE ENCOUNTER
I spoke with pt and informed him of SHABNAM's msg and instructions below and pt states that he has been taking the Amitrptyline and it makes him \"zonks out\" about 30 min after taking it and he does not want to try taking it during the day if it has this effe

## 2017-11-30 NOTE — TELEPHONE ENCOUNTER
Please call patient. Best that patient avoid taking narcotics for stent pain since narcotics have numerous desirable side effects. Instead, patient to take amitriptyline 25 mg 1 tablet 1 hour before bedtime for the stent pain.   If the medication helps du

## 2017-11-30 NOTE — TELEPHONE ENCOUNTER
Pt calling stating he is still having kidney stone pain and only has pain medication to last until tomorrow. He would like a refill on his pain medication until his sx on 12/5. Please call.

## 2017-12-01 RX ORDER — HYDROCODONE BITARTRATE AND ACETAMINOPHEN 5; 325 MG/1; MG/1
1 TABLET ORAL EVERY 6 HOURS PRN
Qty: 10 TABLET | Refills: 0 | Status: SHIPPED | OUTPATIENT
Start: 2017-12-01 | End: 2017-12-05

## 2017-12-01 RX ORDER — HYDROCODONE BITARTRATE AND ACETAMINOPHEN 5; 325 MG/1; MG/1
1 TABLET ORAL EVERY 6 HOURS PRN
Qty: 15 TABLET | Refills: 0 | Status: CANCELLED | OUTPATIENT
Start: 2017-12-01 | End: 2017-12-11

## 2017-12-01 NOTE — TELEPHONE ENCOUNTER
Pt picked up hydrocodone script written by Dr Katharine Trinh, and was informed I would let Dr Yvonne Borges know so he doesn't write a script as well.  Pt states he is in agreement

## 2017-12-01 NOTE — TELEPHONE ENCOUNTER
Spoke with pt and read him PVK entire note. States he understands all. Is aware to stop amitriptyline and start gabapentin (neurontin) and use hydrocodone sparingly.  Script for hydrocodone printed, signed by PVK and pt going to pick it up here at our recep

## 2017-12-01 NOTE — TELEPHONE ENCOUNTER
Pt called to request a refill of his norco. States it was prescribed for kidney stones. He is having lithotripsy on Tuesday but is out of medication.  He is concerned that he will end up in the ER if he doesn't have any pain medication to last him until the

## 2017-12-01 NOTE — TELEPHONE ENCOUNTER
Please call patient; taking narcotics because of ureteral stent not a good idea; he will have a stent again after his ureteroscopic surgery and not good to be exposed for narcotics that long.   I would like him to hold the amitriptyline and I take it and in

## 2017-12-05 ENCOUNTER — TELEPHONE (OUTPATIENT)
Dept: SURGERY | Facility: CLINIC | Age: 32
End: 2017-12-05

## 2017-12-05 ENCOUNTER — APPOINTMENT (OUTPATIENT)
Dept: GENERAL RADIOLOGY | Facility: HOSPITAL | Age: 32
End: 2017-12-05
Attending: UROLOGY
Payer: COMMERCIAL

## 2017-12-05 ENCOUNTER — ANESTHESIA EVENT (OUTPATIENT)
Dept: SURGERY | Facility: HOSPITAL | Age: 32
End: 2017-12-05
Payer: COMMERCIAL

## 2017-12-05 ENCOUNTER — SURGERY (OUTPATIENT)
Age: 32
End: 2017-12-05

## 2017-12-05 ENCOUNTER — ANESTHESIA (OUTPATIENT)
Dept: SURGERY | Facility: HOSPITAL | Age: 32
End: 2017-12-05
Payer: COMMERCIAL

## 2017-12-05 ENCOUNTER — HOSPITAL ENCOUNTER (OUTPATIENT)
Facility: HOSPITAL | Age: 32
Setting detail: HOSPITAL OUTPATIENT SURGERY
Discharge: HOME | End: 2017-12-05
Attending: UROLOGY | Admitting: UROLOGY
Payer: COMMERCIAL

## 2017-12-05 VITALS
BODY MASS INDEX: 32.21 KG/M2 | RESPIRATION RATE: 18 BRPM | WEIGHT: 251 LBS | DIASTOLIC BLOOD PRESSURE: 83 MMHG | HEIGHT: 74 IN | TEMPERATURE: 98 F | SYSTOLIC BLOOD PRESSURE: 137 MMHG | OXYGEN SATURATION: 94 % | HEART RATE: 87 BPM

## 2017-12-05 DIAGNOSIS — N20.1 URETERAL STONE: ICD-10-CM

## 2017-12-05 DIAGNOSIS — N20.1 RIGHT URETERAL CALCULUS: Primary | ICD-10-CM

## 2017-12-05 DIAGNOSIS — N20.0 KIDNEY STONE: Primary | ICD-10-CM

## 2017-12-05 PROCEDURE — 0TC68ZZ EXTIRPATION OF MATTER FROM RIGHT URETER, VIA NATURAL OR ARTIFICIAL OPENING ENDOSCOPIC: ICD-10-PCS | Performed by: UROLOGY

## 2017-12-05 PROCEDURE — 0TC08ZZ EXTIRPATION OF MATTER FROM RIGHT KIDNEY, VIA NATURAL OR ARTIFICIAL OPENING ENDOSCOPIC: ICD-10-PCS | Performed by: UROLOGY

## 2017-12-05 PROCEDURE — 74420 UROGRAPHY RTRGR +-KUB: CPT | Performed by: UROLOGY

## 2017-12-05 PROCEDURE — 0TF38ZZ FRAGMENTATION IN RIGHT KIDNEY PELVIS, VIA NATURAL OR ARTIFICIAL OPENING ENDOSCOPIC: ICD-10-PCS | Performed by: UROLOGY

## 2017-12-05 PROCEDURE — 0TF68ZZ FRAGMENTATION IN RIGHT URETER, VIA NATURAL OR ARTIFICIAL OPENING ENDOSCOPIC: ICD-10-PCS | Performed by: UROLOGY

## 2017-12-05 PROCEDURE — BT1D1ZZ FLUOROSCOPY OF RIGHT KIDNEY, URETER AND BLADDER USING LOW OSMOLAR CONTRAST: ICD-10-PCS | Performed by: UROLOGY

## 2017-12-05 PROCEDURE — 0TP98DZ REMOVAL OF INTRALUMINAL DEVICE FROM URETER, VIA NATURAL OR ARTIFICIAL OPENING ENDOSCOPIC: ICD-10-PCS | Performed by: UROLOGY

## 2017-12-05 PROCEDURE — 52353 CYSTOURETERO W/LITHOTRIPSY: CPT | Performed by: UROLOGY

## 2017-12-05 RX ORDER — MIDAZOLAM HYDROCHLORIDE 1 MG/ML
INJECTION INTRAMUSCULAR; INTRAVENOUS AS NEEDED
Status: DISCONTINUED | OUTPATIENT
Start: 2017-12-05 | End: 2017-12-05 | Stop reason: SURG

## 2017-12-05 RX ORDER — GENTAMICIN SULFATE 40 MG/ML
INJECTION, SOLUTION INTRAMUSCULAR; INTRAVENOUS AS NEEDED
Status: DISCONTINUED | OUTPATIENT
Start: 2017-12-05 | End: 2017-12-05 | Stop reason: HOSPADM

## 2017-12-05 RX ORDER — METOCLOPRAMIDE 10 MG/1
10 TABLET ORAL ONCE
Status: DISCONTINUED | OUTPATIENT
Start: 2017-12-05 | End: 2017-12-05 | Stop reason: HOSPADM

## 2017-12-05 RX ORDER — SODIUM CHLORIDE 9 MG/ML
INJECTION, SOLUTION INTRAVENOUS CONTINUOUS
Status: DISCONTINUED | OUTPATIENT
Start: 2017-12-05 | End: 2017-12-05

## 2017-12-05 RX ORDER — SODIUM CHLORIDE, SODIUM LACTATE, POTASSIUM CHLORIDE, CALCIUM CHLORIDE 600; 310; 30; 20 MG/100ML; MG/100ML; MG/100ML; MG/100ML
INJECTION, SOLUTION INTRAVENOUS CONTINUOUS PRN
Status: DISCONTINUED | OUTPATIENT
Start: 2017-12-05 | End: 2017-12-05 | Stop reason: SURG

## 2017-12-05 RX ORDER — PHENAZOPYRIDINE HYDROCHLORIDE 200 MG/1
200 TABLET, FILM COATED ORAL 3 TIMES DAILY PRN
Qty: 20 TABLET | Refills: 0 | Status: SHIPPED | OUTPATIENT
Start: 2017-12-05 | End: 2018-03-06 | Stop reason: ALTCHOICE

## 2017-12-05 RX ORDER — MORPHINE SULFATE 4 MG/ML
4 INJECTION, SOLUTION INTRAMUSCULAR; INTRAVENOUS EVERY 10 MIN PRN
Status: DISCONTINUED | OUTPATIENT
Start: 2017-12-05 | End: 2017-12-05

## 2017-12-05 RX ORDER — ACETAMINOPHEN 325 MG/1
650 TABLET ORAL EVERY 4 HOURS PRN
Status: DISCONTINUED | OUTPATIENT
Start: 2017-12-05 | End: 2017-12-05

## 2017-12-05 RX ORDER — HYDROMORPHONE HYDROCHLORIDE 1 MG/ML
0.4 INJECTION, SOLUTION INTRAMUSCULAR; INTRAVENOUS; SUBCUTANEOUS EVERY 5 MIN PRN
Status: DISCONTINUED | OUTPATIENT
Start: 2017-12-05 | End: 2017-12-05

## 2017-12-05 RX ORDER — ONDANSETRON 2 MG/ML
INJECTION INTRAMUSCULAR; INTRAVENOUS AS NEEDED
Status: DISCONTINUED | OUTPATIENT
Start: 2017-12-05 | End: 2017-12-05 | Stop reason: SURG

## 2017-12-05 RX ORDER — HALOPERIDOL 5 MG/ML
0.25 INJECTION INTRAMUSCULAR ONCE AS NEEDED
Status: DISCONTINUED | OUTPATIENT
Start: 2017-12-05 | End: 2017-12-05

## 2017-12-05 RX ORDER — DEXAMETHASONE SODIUM PHOSPHATE 4 MG/ML
VIAL (ML) INJECTION AS NEEDED
Status: DISCONTINUED | OUTPATIENT
Start: 2017-12-05 | End: 2017-12-05 | Stop reason: SURG

## 2017-12-05 RX ORDER — MORPHINE SULFATE 2 MG/ML
2 INJECTION, SOLUTION INTRAMUSCULAR; INTRAVENOUS EVERY 10 MIN PRN
Status: DISCONTINUED | OUTPATIENT
Start: 2017-12-05 | End: 2017-12-05

## 2017-12-05 RX ORDER — ACETAMINOPHEN 500 MG
1000 TABLET ORAL ONCE
Status: COMPLETED | OUTPATIENT
Start: 2017-12-05 | End: 2017-12-05

## 2017-12-05 RX ORDER — CEFTRIAXONE 1 G/1
INJECTION, POWDER, FOR SOLUTION INTRAMUSCULAR; INTRAVENOUS AS NEEDED
Status: DISCONTINUED | OUTPATIENT
Start: 2017-12-05 | End: 2017-12-05 | Stop reason: SURG

## 2017-12-05 RX ORDER — HYDROMORPHONE HYDROCHLORIDE 1 MG/ML
0.2 INJECTION, SOLUTION INTRAMUSCULAR; INTRAVENOUS; SUBCUTANEOUS EVERY 5 MIN PRN
Status: DISCONTINUED | OUTPATIENT
Start: 2017-12-05 | End: 2017-12-05

## 2017-12-05 RX ORDER — HYDROMORPHONE HYDROCHLORIDE 1 MG/ML
0.6 INJECTION, SOLUTION INTRAMUSCULAR; INTRAVENOUS; SUBCUTANEOUS EVERY 5 MIN PRN
Status: DISCONTINUED | OUTPATIENT
Start: 2017-12-05 | End: 2017-12-05

## 2017-12-05 RX ORDER — NALOXONE HYDROCHLORIDE 0.4 MG/ML
80 INJECTION, SOLUTION INTRAMUSCULAR; INTRAVENOUS; SUBCUTANEOUS AS NEEDED
Status: DISCONTINUED | OUTPATIENT
Start: 2017-12-05 | End: 2017-12-05

## 2017-12-05 RX ORDER — HYDROCODONE BITARTRATE AND ACETAMINOPHEN 5; 325 MG/1; MG/1
2 TABLET ORAL AS NEEDED
Status: DISCONTINUED | OUTPATIENT
Start: 2017-12-05 | End: 2017-12-05

## 2017-12-05 RX ORDER — LIDOCAINE HYDROCHLORIDE 20 MG/ML
JELLY TOPICAL AS NEEDED
Status: DISCONTINUED | OUTPATIENT
Start: 2017-12-05 | End: 2017-12-05 | Stop reason: HOSPADM

## 2017-12-05 RX ORDER — PHENAZOPYRIDINE HYDROCHLORIDE 200 MG/1
200 TABLET, FILM COATED ORAL ONCE AS NEEDED
Status: DISCONTINUED | OUTPATIENT
Start: 2017-12-05 | End: 2017-12-05

## 2017-12-05 RX ORDER — ONDANSETRON 2 MG/ML
4 INJECTION INTRAMUSCULAR; INTRAVENOUS ONCE AS NEEDED
Status: DISCONTINUED | OUTPATIENT
Start: 2017-12-05 | End: 2017-12-05

## 2017-12-05 RX ORDER — HYDROCODONE BITARTRATE AND ACETAMINOPHEN 5; 325 MG/1; MG/1
2 TABLET ORAL EVERY 4 HOURS PRN
Status: DISCONTINUED | OUTPATIENT
Start: 2017-12-05 | End: 2017-12-05

## 2017-12-05 RX ORDER — HYDROCODONE BITARTRATE AND ACETAMINOPHEN 5; 325 MG/1; MG/1
1 TABLET ORAL AS NEEDED
Status: DISCONTINUED | OUTPATIENT
Start: 2017-12-05 | End: 2017-12-05

## 2017-12-05 RX ORDER — FAMOTIDINE 20 MG/1
20 TABLET ORAL ONCE
Status: DISCONTINUED | OUTPATIENT
Start: 2017-12-05 | End: 2017-12-05 | Stop reason: HOSPADM

## 2017-12-05 RX ORDER — MORPHINE SULFATE 10 MG/ML
6 INJECTION, SOLUTION INTRAMUSCULAR; INTRAVENOUS EVERY 10 MIN PRN
Status: DISCONTINUED | OUTPATIENT
Start: 2017-12-05 | End: 2017-12-05

## 2017-12-05 RX ORDER — LIDOCAINE HYDROCHLORIDE 10 MG/ML
INJECTION, SOLUTION EPIDURAL; INFILTRATION; INTRACAUDAL; PERINEURAL AS NEEDED
Status: DISCONTINUED | OUTPATIENT
Start: 2017-12-05 | End: 2017-12-05 | Stop reason: SURG

## 2017-12-05 RX ORDER — HYDROCODONE BITARTRATE AND ACETAMINOPHEN 5; 325 MG/1; MG/1
1 TABLET ORAL EVERY 4 HOURS PRN
Status: DISCONTINUED | OUTPATIENT
Start: 2017-12-05 | End: 2017-12-05

## 2017-12-05 RX ORDER — SODIUM CHLORIDE, SODIUM LACTATE, POTASSIUM CHLORIDE, CALCIUM CHLORIDE 600; 310; 30; 20 MG/100ML; MG/100ML; MG/100ML; MG/100ML
INJECTION, SOLUTION INTRAVENOUS CONTINUOUS
Status: DISCONTINUED | OUTPATIENT
Start: 2017-12-05 | End: 2017-12-05

## 2017-12-05 RX ADMIN — ONDANSETRON 4 MG: 2 INJECTION INTRAMUSCULAR; INTRAVENOUS at 08:55:00

## 2017-12-05 RX ADMIN — SODIUM CHLORIDE, SODIUM LACTATE, POTASSIUM CHLORIDE, CALCIUM CHLORIDE: 600; 310; 30; 20 INJECTION, SOLUTION INTRAVENOUS at 07:34:00

## 2017-12-05 RX ADMIN — DEXAMETHASONE SODIUM PHOSPHATE 4 MG: 4 MG/ML VIAL (ML) INJECTION at 07:46:00

## 2017-12-05 RX ADMIN — MIDAZOLAM HYDROCHLORIDE 2 MG: 1 INJECTION INTRAMUSCULAR; INTRAVENOUS at 07:34:00

## 2017-12-05 RX ADMIN — LIDOCAINE HYDROCHLORIDE 50 MG: 10 INJECTION, SOLUTION EPIDURAL; INFILTRATION; INTRACAUDAL; PERINEURAL at 07:38:00

## 2017-12-05 RX ADMIN — SODIUM CHLORIDE, SODIUM LACTATE, POTASSIUM CHLORIDE, CALCIUM CHLORIDE: 600; 310; 30; 20 INJECTION, SOLUTION INTRAVENOUS at 07:45:00

## 2017-12-05 RX ADMIN — CEFTRIAXONE 1 G: 1 INJECTION, POWDER, FOR SOLUTION INTRAMUSCULAR; INTRAVENOUS at 07:46:00

## 2017-12-05 NOTE — ANESTHESIA POSTPROCEDURE EVALUATION
Patient: Sugar Escobar    Procedure Summary     Date:  12/05/17 Room / Location:  [address] [place] [address] MAIN OR 14 / [address] [place] [address] MAIN OR    Anesthesia Start:  7054 Anesthesia Stop:  7334    Procedures:       CYSTOSCOPY RETROGRADE (N/A Urethra)      LASER HOLMIUM LITHOTRIPSY (N/A

## 2017-12-05 NOTE — TELEPHONE ENCOUNTER
Urology update    12/5/17 cystoscopy with right ureteroscopy and right nephroscopy with laser ureteroscopic lithotripsy of stone in right ureter, basket extraction of stone from right ureter, nephroscopic basket extraction of stone from right kidney, right

## 2017-12-05 NOTE — BRIEF OP NOTE
[facility] POST ANESTHESIA CARE UNIT  Brief Op Note       Patients Name: Peter Fiorechellys  Attending Physician: Oh Huggins MD  Operating Physician: Gail English MD  CSN: [id number]     Location:  OR  MRN: [id number]    Date of Birth: [birth year]

## 2017-12-05 NOTE — ANESTHESIA PREPROCEDURE EVALUATION
Anesthesia PreOp Note    HPI:     Delice Prader is a 27year old male who presents for preoperative consultation requested by: Massiel Garcia MD    Date of Surgery: 12/5/2017    Procedure(s):  CYSTOSCOPY RETROGRADE  LASER HOLMIUM LITHOTRIPSY  CYSTOS total) by mouth 3 (three) times daily as needed (for burning pain with urination). Disp: 30 tablet Rfl: 1 12/4/2017 at 1200   Meloxicam 7.5 MG Oral Tab Take 1 tablet (7.5 mg total) by mouth daily.  Avoid motrin(ibuprofen) and aleve(naproxen) while taking th Allergies    Family History   Problem Relation Age of Onset   • Cancer Mother    • Ovarian Cancer Mother    • Cancer Sister    • Genetic Disease Sister      Neurofibromatosis   • Breast Cancer Sister    • Other [OTHER] Sister      NERVE SHEATH TUMOR   • BR Right arm   Pulse:  90   Resp:  16   Temp:  97.8 °F (36.6 °C)   TempSrc:  Oral   SpO2:  98%   Weight: 113.4 kg (250 lb) 113.9 kg (251 lb)   Height: 1.88 m (6' 2\") 1.88 m (6' 2\")        Anesthesia ROS/Med Hx and Physical Exam     Patient summary reviewed

## 2017-12-05 NOTE — TELEPHONE ENCOUNTER
Dr. Sally Sahu ,Today I 12/5/17, patient underwent successful right ureteroscopy with laser/basket extraction of right ureteral stone and right nephroscopy with basket extraction of a right kidney stone and stent removal.       Patient still has an asymptomatic 5 mm stone in the contralateral left kidney; I will let him decide whether he wants to have that stone treated at this time; I am also letting him decide if he wants to do metabolic 24 urine collection study to try to understand why he is forming multiple stones.     Many thanks,    Rhianna Cantu M.D.

## 2017-12-05 NOTE — INTERVAL H&P NOTE
Pre-op Diagnosis: Ureteral stone [N20.1]    The above referenced H&P was reviewed by Cynthia Laureano MD on 12/5/2017, the patient was examined and no significant changes have occurred in the patient's condition since the H&P was performed.   I discussed w

## 2017-12-05 NOTE — OPERATIVE REPORT
[facility]    PATIENT'S NAME: Marycruz Marina   ATTENDING PHYSICIAN: Matilda Hogue MD   OPERATING PHYSICIAN: Matilda Hogue MD   PATIENT ACCOUNT#:   [id number]    LOCATION:  [place] [address]  MEDICAL RECORD #:   [id number] seen exiting out of the right orifice. No cancers, masses, or stones in the bladder. Right ureteroscopy shows a stone that is about 5 mm in size in the junction of the upper third and mid third of the right ureter. I break that stone up.   I then proceed angled. I passed that up into the kidney. I then introduced the Storz digital ureteroscope up the ureter. I went up slowly until the stone was encountered. I used a 200 micron holmium laser fiber. Settings as above. The stone was broken up.   Larger f

## 2017-12-05 NOTE — H&P
Lupe Chacon MD   UROLOGY      []Manual[]Template  []Copied        Bennettkaran Emelyn Rodas is a 27year old male.     HPI:   Patient presents with:  Kidney Problem: Right flank pain. Onset 11/14/17. Current pain level 10/10, constant sharp/stabbing pain.  Pa Review of previous records: Urological hx: Pt states he has passed 4-5 stones on his own. Pt has seen Dr. Misti Vargas before, passed the kidney stone.  Pt states he had a 8 mm stone in 2005, where he had a cystoscopy and  and had ESWL at [facility]; pt state Meloxicam 7.5 MG Oral Tab Take 1 tablet (7.5 mg total) by mouth daily. Avoid motrin(ibuprofen) and aleve(naproxen) while taking this medication.  Disp: 14 tablet Rfl: 0   HYDROcodone-acetaminophen (NORCO) 5-325 MG Oral Tab Take 1-2 tabs every 4-6 hours as n Respiratory:  Negative for cough, dyspnea and wheezing        PHYSICAL EXAM:   Constitutional: appears well hydrated alert and responsive no acute distress noted  Neurological: Oriented to time, place, person with normal affect  Exam appropriate for age; p Plan: 11/17/17: Ct Abdomen Pelvis Iv Contrast: KIDNEYS 0.42 cm mildly obstructing stone in the mid right ureter. Discussed treatment options including  KUB plain XR today and cystoscopy, right retrograde pyelogram x-ray, insertion of right ureteral stent, (I74) Renal colic on right side  Plan: Pt describes pain to be 10/10 and describes it to be excruciatingly sharp; pt states Tramadol and Meloxicam provided no relief; please see ureteral stone above.      (R31.29) Microhematuria  Plan: 11/17/17: WBC: 1; RB 9.  In the near future, consider shockwave lithotripsy of the nonobstructing stone in the other (contralateral) left kidney--on the other side     10.  After you have completed all surgery, we will likely want to to submit metabolic 24 urine collection jam

## 2017-12-06 NOTE — TELEPHONE ENCOUNTER
Damon Trent underwent the following =  12/5/17 cystoscopy with right ureteroscopic laser lithotripsy and basket extraction of right ureteral stone and also right nephroscopy with basket extraction of a kidney stone, removal of right ureteral stent, EM, same-day surgery. Went very well.   Many thanks,   Ed Flynn

## 2017-12-06 NOTE — TELEPHONE ENCOUNTER
Noted . Phoned pt and spoke with him. Read to him 's instructions as outlined below in this encounter. Pt verbalized understanding, agrees to plan and is thankful. appt arranged for next Tuesday , 12/12/17. enrike Davis. Karla Mark. Thank you.

## 2017-12-11 ENCOUNTER — APPOINTMENT (OUTPATIENT)
Dept: LAB | Facility: HOSPITAL | Age: 32
End: 2017-12-11
Attending: UROLOGY
Payer: COMMERCIAL

## 2017-12-11 ENCOUNTER — HOSPITAL ENCOUNTER (OUTPATIENT)
Dept: GENERAL RADIOLOGY | Facility: HOSPITAL | Age: 32
Discharge: HOME | End: 2017-12-11
Attending: UROLOGY
Payer: COMMERCIAL

## 2017-12-11 DIAGNOSIS — N20.0 KIDNEY STONE: ICD-10-CM

## 2017-12-11 LAB — URATE SERPL-MCNC: 5.4 MG/DL (ref 3.3–8.7)

## 2017-12-11 PROCEDURE — 74020 XR ABDOMEN MINIMUM 2 VIEWS (CPT=74020): CPT | Performed by: UROLOGY

## 2017-12-11 PROCEDURE — 84550 ASSAY OF BLOOD/URIC ACID: CPT | Performed by: UROLOGY

## 2017-12-11 PROCEDURE — 36415 COLL VENOUS BLD VENIPUNCTURE: CPT | Performed by: UROLOGY

## 2017-12-12 ENCOUNTER — OFFICE VISIT (OUTPATIENT)
Dept: SURGERY | Facility: CLINIC | Age: 32
End: 2017-12-12

## 2017-12-12 VITALS
WEIGHT: 250 LBS | HEIGHT: 74 IN | HEART RATE: 86 BPM | SYSTOLIC BLOOD PRESSURE: 124 MMHG | DIASTOLIC BLOOD PRESSURE: 85 MMHG | BODY MASS INDEX: 32.08 KG/M2 | TEMPERATURE: 98 F

## 2017-12-12 DIAGNOSIS — N20.0 KIDNEY STONE ON LEFT SIDE: Primary | ICD-10-CM

## 2017-12-12 DIAGNOSIS — R39.89 BLADDER PAIN: ICD-10-CM

## 2017-12-12 DIAGNOSIS — R10.31 RIGHT LOWER QUADRANT PAIN: ICD-10-CM

## 2017-12-12 PROCEDURE — 99212 OFFICE O/P EST SF 10 MIN: CPT | Performed by: UROLOGY

## 2017-12-12 PROCEDURE — 99215 OFFICE O/P EST HI 40 MIN: CPT | Performed by: UROLOGY

## 2017-12-12 NOTE — PROGRESS NOTES
HPI:    Patient ID: Delice Prader is a 27year old male. HPI     1. Kidney Stones  Pt states he has passed 4-5 stones on his own, last one was 5-6 years ago.  Pt states he had a 8 mm stone in 2005, where he had a cystoscopy, stent placement and  and h he has completely changed his diet; pt states he his maternal uncles has kidney stones.  11/17/17: Dr. Sara Snow: [facility] ER: \"Three days of sharp/cramping right sided abdominal discomfort; 0.42 cm mildly obstructing stone in the mid right ureter; 0.51 cm nonobst URETERAL STENT   Family History   Problem Relation Age of Onset   • Cancer Mother    • Ovarian Cancer Mother    • Cancer Sister    • Genetic Disease Sister      Neurofibromatosis   • Breast Cancer Sister    • Other [OTHER] Sister      NERVE SHEATH TUMOR Cardiovascular: Normal rate, regular rhythm and normal heart sounds. Exam reveals no gallop and no friction rub. No murmur heard. Pulmonary/Chest: Effort normal and breath sounds normal. No respiratory distress. He has no wheezes. He has no rales.  H stent, right ureteroscopy and right nephroscopy and laser lithotripsy of stone in ureter and kidney and basket extraction of stone endoscopically; calculus in right ureter; right ureteral calculus.  12/11/2017 XR Abdomen = 6 mm linear calcification over the Ultrasound of bladder --- I am ordering this because of of the right lower abdomen pain with sensation of full bladder    4. If the above tests come back unremarkable, please see Dr. Scott Willis for an opinion    5.   Shockwave lithotripsy of stone in t

## 2017-12-12 NOTE — PATIENT INSTRUCTIONS
1. Urine specimen this evening for urine culture and complete urinalysis--exclude possibility of infection; check for abnormalities in the urine. 2.  Ultrasound of the kidneys-- to confirmed that there is no dilation of either kidney, no blockage.     3

## 2017-12-13 ENCOUNTER — HOSPITAL ENCOUNTER (OUTPATIENT)
Dept: ULTRASOUND IMAGING | Facility: HOSPITAL | Age: 32
Discharge: HOME | End: 2017-12-13
Attending: UROLOGY
Payer: COMMERCIAL

## 2017-12-13 ENCOUNTER — TELEPHONE (OUTPATIENT)
Dept: SURGERY | Facility: CLINIC | Age: 32
End: 2017-12-13

## 2017-12-13 DIAGNOSIS — R10.31 RIGHT LOWER QUADRANT PAIN: ICD-10-CM

## 2017-12-13 DIAGNOSIS — R39.89 BLADDER PAIN: ICD-10-CM

## 2017-12-13 DIAGNOSIS — N20.0 KIDNEY STONE ON LEFT SIDE: ICD-10-CM

## 2017-12-13 PROCEDURE — 76770 US EXAM ABDO BACK WALL COMP: CPT | Performed by: UROLOGY

## 2017-12-13 NOTE — TELEPHONE ENCOUNTER
Spoke with Chante Bonds for BC/BS to obtain prior authorization clinicals were given verbally. Authorization # [id number].      Spoke with Hima at [employer] gave verbal authorization number #[id number]

## 2017-12-14 ENCOUNTER — TELEPHONE (OUTPATIENT)
Dept: SURGERY | Facility: CLINIC | Age: 32
End: 2017-12-14

## 2017-12-15 ENCOUNTER — TELEPHONE (OUTPATIENT)
Dept: SURGERY | Facility: CLINIC | Age: 32
End: 2017-12-15

## 2017-12-15 DIAGNOSIS — N20.0 KIDNEY STONE: Primary | ICD-10-CM

## 2017-12-15 NOTE — TELEPHONE ENCOUNTER
Notify patient that starting this Saturday evening 12/16/17,  IF the urine is not bloody, he may start taking ibuprofen 200 mg, two or if necessary even 3 tablets,  TID   with food if he is still feeling pain or discomfort.   Also continue the gabapentin 30

## 2017-12-15 NOTE — TELEPHONE ENCOUNTER
Urology update and urology telephone calls    12/14/17   Left renal ESWL of  5 - 6 mm stone lower calyx left kidney + infusion of IV contrast to better image the stone. General anesthesia. [facility] in [place]. Case went well.     Patient pages m

## 2017-12-15 NOTE — TELEPHONE ENCOUNTER
Spoke with pt who called to report on his pain status as instructed by PVK last night.  Pt states that his pain is manageable at level 5-6 now and he is currently taking the Gabapentin along with the Norco which has helped alleviate some of the pain but not

## 2017-12-17 ENCOUNTER — APPOINTMENT (OUTPATIENT)
Dept: LAB | Facility: HOSPITAL | Age: 32
End: 2017-12-17
Attending: UROLOGY
Payer: COMMERCIAL

## 2017-12-17 DIAGNOSIS — N20.0 KIDNEY STONE: ICD-10-CM

## 2017-12-17 PROCEDURE — 82340 ASSAY OF CALCIUM IN URINE: CPT

## 2017-12-17 PROCEDURE — 84300 ASSAY OF URINE SODIUM: CPT

## 2017-12-17 PROCEDURE — 84105 ASSAY OF URINE PHOSPHORUS: CPT

## 2017-12-17 PROCEDURE — 81003 URINALYSIS AUTO W/O SCOPE: CPT

## 2017-12-17 PROCEDURE — 84560 ASSAY OF URINE/URIC ACID: CPT

## 2017-12-18 ENCOUNTER — TELEPHONE (OUTPATIENT)
Dept: SURGERY | Facility: CLINIC | Age: 32
End: 2017-12-18

## 2017-12-18 NOTE — TELEPHONE ENCOUNTER
Pt. States that he had a Lithotripsy proc done this past Thurs. 12/14, and he is concerned about having a lot of blood in his urine. I tried to reach Acute Triage RN, but no answer.

## 2017-12-18 NOTE — TELEPHONE ENCOUNTER
Matias Cutler, , called stating pt did a 24 hr urine test and dropped off 2 containers for processing. All results are pending except citrate and oxalate as the were not frozen as they were supposed to be. Matias Cutler will call the pt to have him come in and repeat 24 hr urine for those items only, therefore, PVK will receive results at 2 different times.  PVK verbally informed and also routing this note as written documentation DONNIE

## 2017-12-18 NOTE — TELEPHONE ENCOUNTER
Spoke with pt and determined that he is stills having light red bloody urine every time he urinates and states that it is throughout the stream also.  I told him that I spoke with PVK about this and he informed that he may be passing stone fragments that co

## 2017-12-19 NOTE — TELEPHONE ENCOUNTER
Phoned pt and spoke with him. Read to him 's note as outlined below in this encounter, in it's entirety. Pt verbalized understanding , and will call back Thursday, if any persistent issues. He is thankful.

## 2017-12-22 ENCOUNTER — TELEPHONE (OUTPATIENT)
Dept: SURGERY | Facility: CLINIC | Age: 32
End: 2017-12-22

## 2017-12-22 NOTE — TELEPHONE ENCOUNTER
----- Message from Noemy Vu MD sent at 12/21/2017 10:18 PM CST -----  Nurses, please talk to Janae at the  and try to get patient in to see me during the next several weeks because of abnormal 24 urine collection study results.   Here is a

## 2017-12-22 NOTE — TELEPHONE ENCOUNTER
I spoke with pt and determined that he saw his results on my chart and also informed that they missed 2 tests of the 24 hr urine and he will be submitting another specimen in about 2 &1/2 weeks and will need an appt to discuss results after that.  I gave an

## 2017-12-28 RX ORDER — TAMSULOSIN HYDROCHLORIDE 0.4 MG/1
CAPSULE ORAL
Qty: 30 CAPSULE | Refills: 0 | Status: SHIPPED | OUTPATIENT
Start: 2017-12-28 | End: 2018-05-22 | Stop reason: ALTCHOICE

## 2017-12-28 NOTE — TELEPHONE ENCOUNTER
Dr. Susanna Maxwell, pt LOV 12/12/17 pt pharmacy is requesting refill on tamsulosin if you agree please review and sign med, thank you. I copied and pasted part of your last note below.     2. Voiding difficulties --  His [employer] voiding sco

## 2018-01-04 ENCOUNTER — TELEPHONE (OUTPATIENT)
Dept: SURGERY | Facility: CLINIC | Age: 33
End: 2018-01-04

## 2018-01-04 NOTE — TELEPHONE ENCOUNTER
Received pre-op papers that needed  signature. Was signed and faxed back to [facility]. Placed in scan bin.   Thanks, Constantino Boone

## 2018-01-05 RX ORDER — GABAPENTIN 300 MG/1
CAPSULE ORAL
Qty: 90 CAPSULE | Refills: 0 | OUTPATIENT
Start: 2018-01-05

## 2018-01-27 ENCOUNTER — TELEPHONE (OUTPATIENT)
Dept: SURGERY | Facility: CLINIC | Age: 33
End: 2018-01-27

## 2018-01-27 NOTE — TELEPHONE ENCOUNTER
Nurses, please research epic deficiency in 24 urine oxalate and citrate and notify patient accordingly, and urged him to make appointment to see me; this could be done through \"my chart\".          Please research the following--it appears that 24 urine ox

## 2018-01-29 NOTE — TELEPHONE ENCOUNTER
Phoned lab and spoke with [facility]. She states those tests were cancelled d/t improper processing, at the time. States pt was notified and agreed to return to the lab that weekend and did not. See her note attached to the lab.  She states that is why she plac

## 2018-01-30 RX ORDER — TAMSULOSIN HYDROCHLORIDE 0.4 MG/1
CAPSULE ORAL
Qty: 30 CAPSULE | Refills: 0 | OUTPATIENT
Start: 2018-01-30

## 2018-01-30 NOTE — TELEPHONE ENCOUNTER
Pt LOV with VINNY 12/12/17, per telephone call 1/27/18 pt was to make appointment to see PVK after 24 hr urine testing.

## 2018-03-06 ENCOUNTER — OFFICE VISIT (OUTPATIENT)
Dept: FAMILY MEDICINE CLINIC | Facility: CLINIC | Age: 33
End: 2018-03-06

## 2018-03-06 VITALS
BODY MASS INDEX: 32.08 KG/M2 | HEART RATE: 82 BPM | WEIGHT: 250 LBS | SYSTOLIC BLOOD PRESSURE: 127 MMHG | DIASTOLIC BLOOD PRESSURE: 87 MMHG | HEIGHT: 74 IN

## 2018-03-06 DIAGNOSIS — K21.9 GASTROESOPHAGEAL REFLUX DISEASE WITHOUT ESOPHAGITIS: Primary | ICD-10-CM

## 2018-03-06 PROCEDURE — 99212 OFFICE O/P EST SF 10 MIN: CPT | Performed by: FAMILY MEDICINE

## 2018-03-06 PROCEDURE — 99213 OFFICE O/P EST LOW 20 MIN: CPT | Performed by: FAMILY MEDICINE

## 2018-03-06 RX ORDER — LANSOPRAZOLE 30 MG/1
30 CAPSULE, DELAYED RELEASE ORAL 2 TIMES DAILY
Qty: 180 CAPSULE | Refills: 1 | Status: SHIPPED | OUTPATIENT
Start: 2018-03-06 | End: 2018-11-16

## 2018-03-06 RX ORDER — ESCITALOPRAM OXALATE 10 MG/1
10 TABLET ORAL DAILY
Qty: 30 TABLET | Refills: 2 | Status: SHIPPED | OUTPATIENT
Start: 2018-03-06 | End: 2018-07-30

## 2018-03-06 NOTE — PROGRESS NOTES
Blood pressure 127/87, pulse 82, height 6' 2\" (1.88 m), weight 250 lb (113.4 kg). Patient presents today following up for depression. Sister in law  27years old with colon cancer. He is having difficulty with depression. He denies panic attacks.   H

## 2018-04-05 ENCOUNTER — APPOINTMENT (OUTPATIENT)
Dept: GENERAL RADIOLOGY | Age: 33
End: 2018-04-05
Attending: NURSE PRACTITIONER
Payer: COMMERCIAL

## 2018-04-05 ENCOUNTER — HOSPITAL ENCOUNTER (OUTPATIENT)
Age: 33
Discharge: HOME | End: 2018-04-05
Payer: COMMERCIAL

## 2018-04-05 VITALS
DIASTOLIC BLOOD PRESSURE: 106 MMHG | TEMPERATURE: 99 F | HEART RATE: 90 BPM | WEIGHT: 250 LBS | BODY MASS INDEX: 32.08 KG/M2 | OXYGEN SATURATION: 99 % | HEIGHT: 74 IN | RESPIRATION RATE: 20 BRPM | SYSTOLIC BLOOD PRESSURE: 165 MMHG

## 2018-04-05 DIAGNOSIS — S62.669A CLOSED NONDISPLACED FRACTURE OF DISTAL PHALANX OF FINGER, UNSPECIFIED FINGER, INITIAL ENCOUNTER: Primary | ICD-10-CM

## 2018-04-05 PROCEDURE — 99214 OFFICE O/P EST MOD 30 MIN: CPT

## 2018-04-05 PROCEDURE — 26750 TREAT FINGER FRACTURE EACH: CPT

## 2018-04-05 PROCEDURE — 73130 X-RAY EXAM OF HAND: CPT | Performed by: NURSE PRACTITIONER

## 2018-04-05 PROCEDURE — 99213 OFFICE O/P EST LOW 20 MIN: CPT

## 2018-04-05 RX ORDER — IBUPROFEN 600 MG/1
600 TABLET ORAL ONCE
Status: COMPLETED | OUTPATIENT
Start: 2018-04-05 | End: 2018-04-05

## 2018-04-05 RX ORDER — TRAMADOL HYDROCHLORIDE 50 MG/1
TABLET ORAL EVERY 4 HOURS PRN
Qty: 20 TABLET | Refills: 0 | Status: SHIPPED | OUTPATIENT
Start: 2018-04-05 | End: 2018-04-05

## 2018-04-05 RX ORDER — GINSENG 100 MG
CAPSULE ORAL ONCE
Status: COMPLETED | OUTPATIENT
Start: 2018-04-05 | End: 2018-04-05

## 2018-04-05 RX ORDER — IBUPROFEN 600 MG/1
600 TABLET ORAL EVERY 8 HOURS PRN
Qty: 30 TABLET | Refills: 0 | Status: SHIPPED | OUTPATIENT
Start: 2018-04-05 | End: 2018-04-12

## 2018-04-05 RX ORDER — TRAMADOL HYDROCHLORIDE 50 MG/1
TABLET ORAL EVERY 4 HOURS PRN
Qty: 10 TABLET | Refills: 0 | Status: SHIPPED | OUTPATIENT
Start: 2018-04-05 | End: 2018-04-12

## 2018-04-05 NOTE — ED INITIAL ASSESSMENT (HPI)
PATIENT REPORTS HE \"SMASHED\" HIS RIGHT SECOND AND THIRD FINGER ON HIS GARAGE DOOR THIS MORNING. REPORTS NUMBNESS/TINGLING.  + DECREASED ROM. BRUISING NOTED TO RIGHT THIRD FINGER NAIL BED.

## 2018-04-05 NOTE — ED PROVIDER NOTES
No chief complaint on file. HPI:     Luz Tejada is a 27year old male with a past history of nephrolithiasis, anxiety and acid reflux presents with right second and third digit injury.   Patient reports this morning the garage door fell on his ha intra-articular fracture of the second and third distal phalanges. Xr Hand (min 3 Views), Right (cpt=73130)    Result Date: 4/5/2018  CONCLUSION:   Acute extra-articular fractures second and third distal phalanges.   Dictated by (CST): Denisha Sloan application          Answer: Other          Order Specific Question: Please type site of application          Answer: finger    Labs performed this visit:  No results found for this or any previous visit (from the past 10 hour(s)).     Diagnosis:    ICD-10-

## 2018-05-22 ENCOUNTER — TELEPHONE (OUTPATIENT)
Dept: FAMILY MEDICINE CLINIC | Facility: CLINIC | Age: 33
End: 2018-05-22

## 2018-05-22 PROBLEM — F43.21 GRIEF REACTION: Status: ACTIVE | Noted: 2018-05-22

## 2018-05-22 PROBLEM — B37.0 THRUSH: Status: ACTIVE | Noted: 2018-05-22

## 2018-05-22 PROBLEM — F43.20 GRIEF REACTION: Status: ACTIVE | Noted: 2018-05-22

## 2018-05-22 NOTE — PROGRESS NOTES
Blood pressure 129/80, pulse 80, height 6' 2\" (1.88 m), weight 264 lb 9 oz (120 kg). Patient presents today complaining of burning in his mouth FOR  the past 3 weeks. He notices discoloration on his tongue.   He reports that he otherwise notices that t

## 2018-05-22 NOTE — TELEPHONE ENCOUNTER
PLEASE CONTACT PATIENT AND ADVISE HIM THAT MAGIC MOUTHWASH ONLY AVAILABLE AT [facility] ON [address].

## 2018-05-24 RX ORDER — LANSOPRAZOLE 30 MG/1
CAPSULE, DELAYED RELEASE ORAL
Qty: 90 CAPSULE | Refills: 0 | OUTPATIENT
Start: 2018-05-24

## 2018-07-30 ENCOUNTER — OFFICE VISIT (OUTPATIENT)
Dept: SURGERY | Facility: CLINIC | Age: 33
End: 2018-07-30
Payer: COMMERCIAL

## 2018-07-30 VITALS
HEART RATE: 85 BPM | TEMPERATURE: 98 F | RESPIRATION RATE: 16 BRPM | HEIGHT: 74 IN | BODY MASS INDEX: 33.37 KG/M2 | WEIGHT: 260 LBS | SYSTOLIC BLOOD PRESSURE: 123 MMHG | DIASTOLIC BLOOD PRESSURE: 82 MMHG

## 2018-07-30 DIAGNOSIS — Z87.442 HISTORY OF KIDNEY STONES: ICD-10-CM

## 2018-07-30 DIAGNOSIS — E87.0 HYPERNATRIURIA: ICD-10-CM

## 2018-07-30 DIAGNOSIS — R82.994 HYPERCALCIURIA: ICD-10-CM

## 2018-07-30 DIAGNOSIS — Z30.09 VASECTOMY EVALUATION: ICD-10-CM

## 2018-07-30 DIAGNOSIS — R82.993 HYPERURICOSURIA: Primary | ICD-10-CM

## 2018-07-30 PROCEDURE — 99212 OFFICE O/P EST SF 10 MIN: CPT | Performed by: UROLOGY

## 2018-07-30 PROCEDURE — 99215 OFFICE O/P EST HI 40 MIN: CPT | Performed by: UROLOGY

## 2018-07-30 RX ORDER — CEFADROXIL 500 MG/1
CAPSULE ORAL
Qty: 10 CAPSULE | Refills: 0 | Status: SHIPPED | OUTPATIENT
Start: 2018-07-30 | End: 2018-11-29

## 2018-07-30 RX ORDER — HYDROCODONE BITARTRATE AND ACETAMINOPHEN 5; 325 MG/1; MG/1
TABLET ORAL
Qty: 15 TABLET | Refills: 0 | Status: ON HOLD | OUTPATIENT
Start: 2018-07-30 | End: 2018-11-30

## 2018-07-30 RX ORDER — DIAZEPAM 5 MG/1
TABLET ORAL
Qty: 3 TABLET | Refills: 0 | Status: SHIPPED | OUTPATIENT
Start: 2018-07-30 | End: 2018-11-29

## 2018-07-30 NOTE — PATIENT INSTRUCTIONS
1.  Please schedule separate office visit to review your abnormal biochemistry in the urine (elevated urine uric acid, calcium, sodium levels) and to address your recent history of kidney stones.   Please do metabolic 24 urine collection study 10-14 days be perform the vasectomy either at the office, or [address] on the first floor of the [facility],   depending on which of the two  is better for you with respect to your insurance coverage.   If you choose to have the procedure performed i

## 2018-07-30 NOTE — PROGRESS NOTES
Geo Toddasif is a 26year old male. HPI:   Patient presents with:  Vasectomy      History provided by pt. [facility]                 Patient is 26  and his wife is   35 . They have   10 y/o girland 3 y/o boy.   This is the Patient is not currently taking  medications. The patient feels that the voiding dysfunction is stable compared to last time. Patient states he feels \"pleased\" about his urinating problem. Right groin pain--RESOLVED   Started 12/10/2017.  Severity of years; still current smoker       HISTORY:  Past Medical History:   Diagnosis Date   • Anxiety state    • Calculus of kidney    • Cellulitis due to MRSA     left knee   • Esophageal reflux    • Nephrolithiasis     cystoscopy   • Tonsillitis    • Torn menis Tab Take  by mouth.  Disp:  Rfl:        Allergies:  No Known Allergies      ROS:   Genitourinary:  Negative for dysuria and hematuria    Gastrointestinal:  Negative for abdominal pain, constipation, decreased appetite, diarrhea and vomiting    Neurological: CB  Back/Spine: no abnormalities noted  Extremities: no cyanosis, or clubbing; edema none    LABORATORIES    09/91/8208 Metabolic 24 hour urine collection = Total Volume - 6,650 mL; Calcium - 645 (hypercalcuria );  Na Urine - 286 (Hypernatruria 40-22 fertile immediately after the operation and could make his wife pregnant and has to bring in two separate semen specimens six weeks apart before we confirm that there are no longer any motile sperm in his distal reproductive tract.   I explained to him and longstanding postoperative testicular pain can occur lasting for years.   I have explained to patient  possible complications including possible bleeding, clot, infection,  nerve, anesthesia complications, persistent post-operative pain, possible injury to precautions  at this point, and  also after the bilateral vasectomy until we tell him that it is safe to stop doing so.   He understands and agrees.    (R82.99) Hyperuricosuria  58/29/8433 Metabolic 24 hour urine collection shows Uric Urine - 751 ([place] the procedure    5. DIAZEPAM (generic Valium) 5 mg tablet,    THREE TABLETS FOR A TOTAL OF 15 mg    by mouth  when you arrive at our office for the procedure, and you must have a     6.   HYDROCODONE  5/325 mg  TWO  tablets p.o. when you arrive  at o Excretion 24HR      Sodium, Urine, 24-Hour [E]      Urine Phosphorus 24 HR      Uric Acid, Urine 24 hr      Urine PH 24 HR    Meds This Visit:    Signed Prescriptions Disp Refills    Cefadroxil 500 MG Oral Cap 10 capsule 0      Sig: Take 1 capsule twice a

## 2018-11-07 ENCOUNTER — HOSPITAL ENCOUNTER (OUTPATIENT)
Dept: GENERAL RADIOLOGY | Age: 33
Discharge: HOME | End: 2018-11-07
Attending: FAMILY MEDICINE
Payer: COMMERCIAL

## 2018-11-07 ENCOUNTER — OFFICE VISIT (OUTPATIENT)
Dept: FAMILY MEDICINE CLINIC | Facility: CLINIC | Age: 33
End: 2018-11-07
Payer: COMMERCIAL

## 2018-11-07 VITALS
DIASTOLIC BLOOD PRESSURE: 80 MMHG | BODY MASS INDEX: 35.55 KG/M2 | SYSTOLIC BLOOD PRESSURE: 144 MMHG | WEIGHT: 277 LBS | HEART RATE: 90 BPM | HEIGHT: 74 IN

## 2018-11-07 DIAGNOSIS — R10.9 RIGHT SIDED ABDOMINAL PAIN: ICD-10-CM

## 2018-11-07 DIAGNOSIS — R05.9 COUGH: Primary | ICD-10-CM

## 2018-11-07 DIAGNOSIS — R05.9 COUGH: ICD-10-CM

## 2018-11-07 PROCEDURE — 71100 X-RAY EXAM RIBS UNI 2 VIEWS: CPT | Performed by: FAMILY MEDICINE

## 2018-11-07 PROCEDURE — 99214 OFFICE O/P EST MOD 30 MIN: CPT | Performed by: FAMILY MEDICINE

## 2018-11-07 PROCEDURE — 99212 OFFICE O/P EST SF 10 MIN: CPT | Performed by: FAMILY MEDICINE

## 2018-11-07 PROCEDURE — 90686 IIV4 VACC NO PRSV 0.5 ML IM: CPT | Performed by: FAMILY MEDICINE

## 2018-11-07 PROCEDURE — 90471 IMMUNIZATION ADMIN: CPT | Performed by: FAMILY MEDICINE

## 2018-11-07 PROCEDURE — 71046 X-RAY EXAM CHEST 2 VIEWS: CPT | Performed by: FAMILY MEDICINE

## 2018-11-07 RX ORDER — PREDNISONE 20 MG/1
20 TABLET ORAL 3 TIMES DAILY
Qty: 15 TABLET | Refills: 0 | Status: SHIPPED | OUTPATIENT
Start: 2018-11-07 | End: 2018-11-17 | Stop reason: ALTCHOICE

## 2018-11-07 NOTE — PROGRESS NOTES
Blood pressure 144/80, pulse 90, height 6' 2\" (1.88 m), weight 277 lb (125.6 kg). Patient presents today with a 2-3-week history of coughing up brown phlegm chronic history of heartburn. He is a smoker some bad breath.   Had nasal congestion has now re

## 2018-11-09 ENCOUNTER — LAB ENCOUNTER (OUTPATIENT)
Dept: LAB | Age: 33
End: 2018-11-09
Attending: FAMILY MEDICINE
Payer: COMMERCIAL

## 2018-11-09 ENCOUNTER — TELEPHONE (OUTPATIENT)
Dept: FAMILY MEDICINE CLINIC | Facility: CLINIC | Age: 33
End: 2018-11-09

## 2018-11-09 ENCOUNTER — TELEPHONE (OUTPATIENT)
Dept: OTHER | Age: 33
End: 2018-11-09

## 2018-11-09 DIAGNOSIS — R10.11 RUQ ABDOMINAL PAIN: ICD-10-CM

## 2018-11-09 DIAGNOSIS — R10.11 RUQ ABDOMINAL PAIN: Primary | ICD-10-CM

## 2018-11-09 PROCEDURE — 82150 ASSAY OF AMYLASE: CPT

## 2018-11-09 PROCEDURE — 36415 COLL VENOUS BLD VENIPUNCTURE: CPT

## 2018-11-09 PROCEDURE — 80076 HEPATIC FUNCTION PANEL: CPT

## 2018-11-09 PROCEDURE — 85025 COMPLETE CBC W/AUTO DIFF WBC: CPT

## 2018-11-09 PROCEDURE — 83690 ASSAY OF LIPASE: CPT

## 2018-11-09 RX ORDER — HYDROCODONE BITARTRATE AND ACETAMINOPHEN 5; 325 MG/1; MG/1
TABLET ORAL
Qty: 20 TABLET | Refills: 0 | Status: ON HOLD | OUTPATIENT
Start: 2018-11-09 | End: 2018-11-30

## 2018-11-09 NOTE — TELEPHONE ENCOUNTER
Patient seen briefly in office at  complains of significant right upper quadrant pain that persists he has eaten a sandwich today.   Hydrocodone prescription given patient to go downstairs for blood test will check results later orderS entered sta

## 2018-11-09 NOTE — TELEPHONE ENCOUNTER
Spoke with patient who reports he saw Dr. Alex Ortega on 11/7/18 and was instructed to call clinic if the pain in the rib area on the right side of the chest persisted. Patient reports the pain continues and the pain level is at a 5-6/10 and it is constant.  Pa

## 2018-11-09 NOTE — TELEPHONE ENCOUNTER
Spoke to pt. Notified pt that prescription for Donia Hero is available for pick-up at [facility] front office. He voiced understanding.

## 2018-11-16 ENCOUNTER — TELEPHONE (OUTPATIENT)
Dept: FAMILY MEDICINE CLINIC | Facility: CLINIC | Age: 33
End: 2018-11-16

## 2018-11-16 RX ORDER — LANSOPRAZOLE 30 MG/1
CAPSULE, DELAYED RELEASE ORAL
Qty: 180 CAPSULE | Refills: 3 | Status: SHIPPED | OUTPATIENT
Start: 2018-11-16 | End: 2019-11-13

## 2018-11-16 NOTE — TELEPHONE ENCOUNTER
Regarding: RE: RE: results  Contact: [phone]  ----- Message from Skye Powers RN sent at 11/16/2018  3:37 PM CST -----  Tomorrow's appt request, please advise     ----- Message from Robyn Bragg to Nakia Kirkland DO sent at 11/16/2018

## 2018-11-17 ENCOUNTER — HOSPITAL ENCOUNTER (OUTPATIENT)
Dept: ULTRASOUND IMAGING | Age: 33
Discharge: HOME | End: 2018-11-17
Attending: FAMILY MEDICINE
Payer: COMMERCIAL

## 2018-11-17 ENCOUNTER — TELEPHONE (OUTPATIENT)
Dept: OTHER | Age: 33
End: 2018-11-17

## 2018-11-17 ENCOUNTER — APPOINTMENT (OUTPATIENT)
Dept: LAB | Age: 33
End: 2018-11-17
Attending: FAMILY MEDICINE
Payer: COMMERCIAL

## 2018-11-17 ENCOUNTER — OFFICE VISIT (OUTPATIENT)
Dept: FAMILY MEDICINE CLINIC | Facility: CLINIC | Age: 33
End: 2018-11-17
Payer: COMMERCIAL

## 2018-11-17 VITALS
HEIGHT: 74 IN | SYSTOLIC BLOOD PRESSURE: 117 MMHG | BODY MASS INDEX: 35.16 KG/M2 | WEIGHT: 274 LBS | DIASTOLIC BLOOD PRESSURE: 73 MMHG | HEART RATE: 80 BPM

## 2018-11-17 DIAGNOSIS — R10.11 RUQ ABDOMINAL PAIN: ICD-10-CM

## 2018-11-17 DIAGNOSIS — R10.11 RUQ ABDOMINAL PAIN: Primary | ICD-10-CM

## 2018-11-17 DIAGNOSIS — R10.13 EPIGASTRIC ABDOMINAL PAIN: Primary | ICD-10-CM

## 2018-11-17 PROCEDURE — 81003 URINALYSIS AUTO W/O SCOPE: CPT

## 2018-11-17 PROCEDURE — 99213 OFFICE O/P EST LOW 20 MIN: CPT | Performed by: FAMILY MEDICINE

## 2018-11-17 PROCEDURE — 76705 ECHO EXAM OF ABDOMEN: CPT | Performed by: FAMILY MEDICINE

## 2018-11-17 PROCEDURE — 99212 OFFICE O/P EST SF 10 MIN: CPT | Performed by: FAMILY MEDICINE

## 2018-11-17 NOTE — TELEPHONE ENCOUNTER
Discussed with patient has family obligation today refused ER so I advised him to go to ER if severe pain/vomiting/fever discussed with Dr. Rolf Mukherjee patient to contact surgery first thing AM Monday.

## 2018-11-17 NOTE — PROGRESS NOTES
Blood pressure 117/73, pulse 80, height 6' 2\" (1.88 m), weight 274 lb (124.3 kg). CONTINUED RUQ ABDOMINAL PAIN RADIATES TO BACK AND RIGHT SHOULDER FOR ONE MONTH.  SOME RELIEF AT END OF PREDNISONE COURSE. OCCASIONAL NAUSEA NO VOMITING.   SOME LOOSE stoo

## 2018-11-17 NOTE — TELEPHONE ENCOUNTER
Loyda calling with stat UA results.      URINALYSIS, ROUTINE   Order: [id number]   Collected:  11/17/2018 10:08 AM Status:  Final result Dx:  RUQ abdominal pain    Ref Range & Units 11/17/18 10:08 AM   Urine Color Yellow Yellow    Clarity Urine Clear Clear

## 2018-11-17 NOTE — TELEPHONE ENCOUNTER
US called with stat results. Results given to  [facility]. Orders received below. Informed pt orders. Verbalized understanding. Per  [facility] if a PA not able to be obtained. Pt should head to ER. Pt verbalized understanding.      [11/17/2018 10:15 AM] Sohail

## 2018-11-17 NOTE — TELEPHONE ENCOUNTER
Spoke to patient and per [facility] [place] told him to head to ER but pt declined he stated he will not be going to ER he has been having pain for a while he can wait for Monday for to do something else.

## 2018-11-19 ENCOUNTER — TELEPHONE (OUTPATIENT)
Dept: FAMILY MEDICINE CLINIC | Facility: CLINIC | Age: 33
End: 2018-11-19

## 2018-11-19 NOTE — TELEPHONE ENCOUNTER
Pt informed of [facility]'s response below. Pt states he was actually asking why [facility] is referring him to a surgeon?

## 2018-11-19 NOTE — TELEPHONE ENCOUNTER
Spoke w/pt, and informed [facility] per [employer]. Transferred pt to scheduling.

## 2018-11-19 NOTE — TELEPHONE ENCOUNTER
Patient called back today. He is questioning why he has to call surgeon's office first without getting ct scan done. Patient still has discomfort, however denied any vomiting, no fever, not in severe pain.      He states he would like to go ahead and sc

## 2018-11-19 NOTE — TELEPHONE ENCOUNTER
Patient calling to request his recent labs be faxed over to [facility] per their request  Fax [phone], fax completed.

## 2018-11-19 NOTE — TELEPHONE ENCOUNTER
I am not too concerned in what order patient has CT or surgery appointment. Please advise patient to schedule both ASAP if no prior auth needed for CT scan.

## 2018-11-19 NOTE — TELEPHONE ENCOUNTER
Kristin Dorantes is requesting a fax of the pt's CT so she can schedule the pt in. pls advise.  Thank you    Fax : [phone]

## 2018-11-21 ENCOUNTER — TELEPHONE (OUTPATIENT)
Dept: FAMILY MEDICINE CLINIC | Facility: CLINIC | Age: 33
End: 2018-11-21

## 2018-11-21 DIAGNOSIS — R10.11 RUQ ABDOMINAL PAIN: Primary | ICD-10-CM

## 2018-11-21 NOTE — TELEPHONE ENCOUNTER
CT results reviewed. Patient to schedule hepatobiliary scan order entered. Please remind patient to schedule appointment with general surgery as we discussed.

## 2018-11-21 NOTE — TELEPHONE ENCOUNTER
Pt informed of [facility]'s message and orders as per below. Pt agrees with order; info provided. States will call now for appt with surgeon.

## 2018-11-27 ENCOUNTER — OFFICE VISIT (OUTPATIENT)
Dept: SURGERY | Facility: CLINIC | Age: 33
End: 2018-11-27
Payer: COMMERCIAL

## 2018-11-27 ENCOUNTER — TELEPHONE (OUTPATIENT)
Dept: SURGERY | Facility: CLINIC | Age: 33
End: 2018-11-27

## 2018-11-27 VITALS — HEIGHT: 74 IN | BODY MASS INDEX: 34.65 KG/M2 | WEIGHT: 270 LBS

## 2018-11-27 DIAGNOSIS — K80.50 BILIARY COLIC: Primary | ICD-10-CM

## 2018-11-27 PROCEDURE — 99212 OFFICE O/P EST SF 10 MIN: CPT | Performed by: SURGERY

## 2018-11-27 PROCEDURE — 99204 OFFICE O/P NEW MOD 45 MIN: CPT | Performed by: SURGERY

## 2018-11-27 NOTE — H&P (VIEW-ONLY)
HPI:    Patient ID: Yue Boogie is a 26year old male presenting with Patient presents with:  Gallbladder: Pt referred by Dr. Leland Washington regarding RUQ pain x 6 wks. Pt c/o nausea but no vomitting. Pt has had diarrhea for 6 wks. Isabella Laureano     HPI    Past insecurity - worry: Not on file      Food insecurity - inability: Not on file      Transportation needs - medical: Not on file      Transportation needs - non-medical: Not on file    Occupational History      Occupation:     Tobacco Use Current Outpatient Medications:  LANSOPRAZOLE 30 MG Oral Capsule Delayed Release TAKE ONE CAPSULE BY MOUTH TWICE DAILY Disp: 180 capsule Rfl: 3   HYDROcodone-acetaminophen (NORCO) 5-325 MG Oral Tab TAKE 1-2 TABS EVERY 4-6 HOURS AS NEEDED.  Disp: 20 Diagnoses and all orders for this visit:    Biliary colic    Likely from biliary dyskinesia. Also discuss the possibility of a normal EF on the HIDA scan.   Plan for a laparoscopic cholecystectomy when pt has made a decision regarding the date for surger

## 2018-11-27 NOTE — TELEPHONE ENCOUNTER
Contacted patient. Offered 12/06/2018 at [address] [place] [address]. Patient accepted. Preop instructions given, will send to patient via MyChart-patient confirmed phone number. Patient verbalized understanding and all questions were answered.

## 2018-11-27 NOTE — H&P
HPI:    Patient ID: Missael Torres is a 26year old male presenting with Patient presents with:  Gallbladder: Pt referred by Dr. Silverio Chandler regarding RUQ pain x 6 wks. Pt c/o nausea but no vomitting. Pt has had diarrhea for 6 wks. Jessie Campbell     HPI    Past insecurity - worry: Not on file      Food insecurity - inability: Not on file      Transportation needs - medical: Not on file      Transportation needs - non-medical: Not on file    Occupational History      Occupation:     Tobacco Use Current Outpatient Medications:  LANSOPRAZOLE 30 MG Oral Capsule Delayed Release TAKE ONE CAPSULE BY MOUTH TWICE DAILY Disp: 180 capsule Rfl: 3   HYDROcodone-acetaminophen (NORCO) 5-325 MG Oral Tab TAKE 1-2 TABS EVERY 4-6 HOURS AS NEEDED.  Disp: 20 Diagnoses and all orders for this visit:    Biliary colic    Likely from biliary dyskinesia. Also discuss the possibility of a normal EF on the HIDA scan.   Plan for a laparoscopic cholecystectomy when pt has made a decision regarding the date for surger

## 2018-11-28 ENCOUNTER — TELEPHONE (OUTPATIENT)
Dept: SURGERY | Facility: CLINIC | Age: 33
End: 2018-11-28

## 2018-11-28 NOTE — TELEPHONE ENCOUNTER
Surgical Procedure: Laparoscopic cholecystectomy  Date: 12/13/2018  Location: [facility]  CPT: 32906  ICD-10: K80.50  Insurance Provider: [facility]  Policy #: [id number]  Phone #: [place] [phone]  Representative: Left message on [facility] [employer]

## 2018-11-28 NOTE — TELEPHONE ENCOUNTER
Contacted patient. Due to schedule conflict, need to change surgery date from 12/06/2018. Offered 12/13/2018, 12/17/2018, 12/19/2018, or 12/20/2018. Patient accepted new date 12/13/2018, verbalized understanding. All questions answered. Surgical case change request reflecting above change entered.

## 2018-11-29 NOTE — TELEPHONE ENCOUNTER
Discussed with Dr. Quenton Cranker patient's [place] message. Ok to add surgery tomorrow AM at 0715 start. Contacted patient and informed him of above. Also informed patient we obtained authorization for the procedure from his insurance company (please see other encounter-prior authorization). Advised patient he will receive a call later today with exact time and details, but generally he should be at the hospital 90 minutes from start time. He will need someone to drive him, NPO after MN tonight. No ASA/NSAIDs. Patient verbalized understanding, all questions answered. Surgical case change request entered and sent to [address] [place] [address] OR via [email or web address].

## 2018-11-30 ENCOUNTER — HOSPITAL ENCOUNTER (OUTPATIENT)
Facility: HOSPITAL | Age: 33
Setting detail: HOSPITAL OUTPATIENT SURGERY
Discharge: HOME | End: 2018-11-30
Attending: SURGERY | Admitting: SURGERY
Payer: COMMERCIAL

## 2018-11-30 ENCOUNTER — ANESTHESIA EVENT (OUTPATIENT)
Dept: SURGERY | Facility: HOSPITAL | Age: 33
End: 2018-11-30
Payer: COMMERCIAL

## 2018-11-30 ENCOUNTER — ANESTHESIA (OUTPATIENT)
Dept: SURGERY | Facility: HOSPITAL | Age: 33
End: 2018-11-30
Payer: COMMERCIAL

## 2018-11-30 VITALS
HEART RATE: 68 BPM | DIASTOLIC BLOOD PRESSURE: 79 MMHG | OXYGEN SATURATION: 97 % | SYSTOLIC BLOOD PRESSURE: 126 MMHG | BODY MASS INDEX: 35.92 KG/M2 | HEIGHT: 73 IN | TEMPERATURE: 98 F | RESPIRATION RATE: 14 BRPM | WEIGHT: 271 LBS

## 2018-11-30 DIAGNOSIS — K80.50 BILIARY COLIC: ICD-10-CM

## 2018-11-30 PROCEDURE — 47562 LAPAROSCOPIC CHOLECYSTECTOMY: CPT | Performed by: SURGERY

## 2018-11-30 PROCEDURE — 0FT44ZZ RESECTION OF GALLBLADDER, PERCUTANEOUS ENDOSCOPIC APPROACH: ICD-10-PCS | Performed by: SURGERY

## 2018-11-30 RX ORDER — SODIUM CHLORIDE, SODIUM LACTATE, POTASSIUM CHLORIDE, CALCIUM CHLORIDE 600; 310; 30; 20 MG/100ML; MG/100ML; MG/100ML; MG/100ML
INJECTION, SOLUTION INTRAVENOUS CONTINUOUS
Status: DISCONTINUED | OUTPATIENT
Start: 2018-11-30 | End: 2018-11-30

## 2018-11-30 RX ORDER — POLYETHYLENE GLYCOL 3350 17 G/17G
17 POWDER, FOR SOLUTION ORAL DAILY
Qty: 14 PACKET | Refills: 0 | Status: SHIPPED | OUTPATIENT
Start: 2018-11-30 | End: 2018-12-14

## 2018-11-30 RX ORDER — MORPHINE SULFATE 4 MG/ML
2 INJECTION, SOLUTION INTRAMUSCULAR; INTRAVENOUS EVERY 10 MIN PRN
Status: DISCONTINUED | OUTPATIENT
Start: 2018-11-30 | End: 2018-11-30

## 2018-11-30 RX ORDER — LIDOCAINE HYDROCHLORIDE 10 MG/ML
INJECTION, SOLUTION EPIDURAL; INFILTRATION; INTRACAUDAL; PERINEURAL AS NEEDED
Status: DISCONTINUED | OUTPATIENT
Start: 2018-11-30 | End: 2018-11-30 | Stop reason: SURG

## 2018-11-30 RX ORDER — NEOSTIGMINE METHYLSULFATE 0.5 MG/ML
INJECTION INTRAVENOUS AS NEEDED
Status: DISCONTINUED | OUTPATIENT
Start: 2018-11-30 | End: 2018-11-30 | Stop reason: SURG

## 2018-11-30 RX ORDER — FAMOTIDINE 20 MG/1
20 TABLET ORAL ONCE
Status: COMPLETED | OUTPATIENT
Start: 2018-11-30 | End: 2018-11-30

## 2018-11-30 RX ORDER — HALOPERIDOL 5 MG/ML
0.25 INJECTION INTRAMUSCULAR ONCE AS NEEDED
Status: DISCONTINUED | OUTPATIENT
Start: 2018-11-30 | End: 2018-11-30

## 2018-11-30 RX ORDER — MORPHINE SULFATE 4 MG/ML
4 INJECTION, SOLUTION INTRAMUSCULAR; INTRAVENOUS EVERY 10 MIN PRN
Status: DISCONTINUED | OUTPATIENT
Start: 2018-11-30 | End: 2018-11-30

## 2018-11-30 RX ORDER — HYDROCODONE BITARTRATE AND ACETAMINOPHEN 5; 325 MG/1; MG/1
1 TABLET ORAL AS NEEDED
Status: DISCONTINUED | OUTPATIENT
Start: 2018-11-30 | End: 2018-11-30

## 2018-11-30 RX ORDER — METOCLOPRAMIDE 10 MG/1
10 TABLET ORAL ONCE
Status: DISCONTINUED | OUTPATIENT
Start: 2018-11-30 | End: 2018-11-30 | Stop reason: HOSPADM

## 2018-11-30 RX ORDER — ROCURONIUM BROMIDE 10 MG/ML
INJECTION, SOLUTION INTRAVENOUS AS NEEDED
Status: DISCONTINUED | OUTPATIENT
Start: 2018-11-30 | End: 2018-11-30 | Stop reason: SURG

## 2018-11-30 RX ORDER — MORPHINE SULFATE 10 MG/ML
6 INJECTION, SOLUTION INTRAMUSCULAR; INTRAVENOUS EVERY 10 MIN PRN
Status: DISCONTINUED | OUTPATIENT
Start: 2018-11-30 | End: 2018-11-30

## 2018-11-30 RX ORDER — ONDANSETRON 2 MG/ML
INJECTION INTRAMUSCULAR; INTRAVENOUS AS NEEDED
Status: DISCONTINUED | OUTPATIENT
Start: 2018-11-30 | End: 2018-11-30 | Stop reason: SURG

## 2018-11-30 RX ORDER — ONDANSETRON 2 MG/ML
4 INJECTION INTRAMUSCULAR; INTRAVENOUS ONCE AS NEEDED
Status: COMPLETED | OUTPATIENT
Start: 2018-11-30 | End: 2018-11-30

## 2018-11-30 RX ORDER — HYDROCODONE BITARTRATE AND ACETAMINOPHEN 10; 325 MG/1; MG/1
1 TABLET ORAL EVERY 4 HOURS PRN
Qty: 30 TABLET | Refills: 0 | Status: SHIPPED | OUTPATIENT
Start: 2018-11-30 | End: 2019-05-22

## 2018-11-30 RX ORDER — NALOXONE HYDROCHLORIDE 0.4 MG/ML
80 INJECTION, SOLUTION INTRAMUSCULAR; INTRAVENOUS; SUBCUTANEOUS AS NEEDED
Status: DISCONTINUED | OUTPATIENT
Start: 2018-11-30 | End: 2018-11-30

## 2018-11-30 RX ORDER — DEXAMETHASONE SODIUM PHOSPHATE 4 MG/ML
VIAL (ML) INJECTION AS NEEDED
Status: DISCONTINUED | OUTPATIENT
Start: 2018-11-30 | End: 2018-11-30 | Stop reason: SURG

## 2018-11-30 RX ORDER — GLYCOPYRROLATE 0.2 MG/ML
INJECTION INTRAMUSCULAR; INTRAVENOUS AS NEEDED
Status: DISCONTINUED | OUTPATIENT
Start: 2018-11-30 | End: 2018-11-30 | Stop reason: SURG

## 2018-11-30 RX ORDER — ACETAMINOPHEN 500 MG
1000 TABLET ORAL ONCE
Status: COMPLETED | OUTPATIENT
Start: 2018-11-30 | End: 2018-11-30

## 2018-11-30 RX ORDER — HYDROCODONE BITARTRATE AND ACETAMINOPHEN 5; 325 MG/1; MG/1
2 TABLET ORAL AS NEEDED
Status: DISCONTINUED | OUTPATIENT
Start: 2018-11-30 | End: 2018-11-30

## 2018-11-30 RX ORDER — BUPIVACAINE HYDROCHLORIDE 5 MG/ML
INJECTION, SOLUTION EPIDURAL; INTRACAUDAL AS NEEDED
Status: DISCONTINUED | OUTPATIENT
Start: 2018-11-30 | End: 2018-11-30 | Stop reason: HOSPADM

## 2018-11-30 RX ADMIN — GLYCOPYRROLATE 0.6 MG: 0.2 INJECTION INTRAMUSCULAR; INTRAVENOUS at 07:56:00

## 2018-11-30 RX ADMIN — ONDANSETRON 4 MG: 2 INJECTION INTRAMUSCULAR; INTRAVENOUS at 07:19:00

## 2018-11-30 RX ADMIN — ROCURONIUM BROMIDE 40 MG: 10 INJECTION, SOLUTION INTRAVENOUS at 07:19:00

## 2018-11-30 RX ADMIN — SODIUM CHLORIDE, SODIUM LACTATE, POTASSIUM CHLORIDE, CALCIUM CHLORIDE: 600; 310; 30; 20 INJECTION, SOLUTION INTRAVENOUS at 08:05:00

## 2018-11-30 RX ADMIN — GLYCOPYRROLATE 0.2 MG: 0.2 INJECTION INTRAMUSCULAR; INTRAVENOUS at 07:19:00

## 2018-11-30 RX ADMIN — SODIUM CHLORIDE, SODIUM LACTATE, POTASSIUM CHLORIDE, CALCIUM CHLORIDE: 600; 310; 30; 20 INJECTION, SOLUTION INTRAVENOUS at 07:15:00

## 2018-11-30 RX ADMIN — DEXAMETHASONE SODIUM PHOSPHATE 4 MG: 4 MG/ML VIAL (ML) INJECTION at 07:19:00

## 2018-11-30 RX ADMIN — LIDOCAINE HYDROCHLORIDE 50 MG: 10 INJECTION, SOLUTION EPIDURAL; INFILTRATION; INTRACAUDAL; PERINEURAL at 07:19:00

## 2018-11-30 RX ADMIN — NEOSTIGMINE METHYLSULFATE 5 MG: 0.5 INJECTION INTRAVENOUS at 07:56:00

## 2018-11-30 NOTE — ANESTHESIA PREPROCEDURE EVALUATION
Anesthesia PreOp Note    HPI:     Katalina Angeles is a 26year old male who presents for preoperative consultation requested by: Ashley Guzmán MD    Date of Surgery: 11/30/2018    Procedure(s):  LAPAROSCOPIC CHOLECYSTECTOMY  Indication: Biliary colic [E76.63 Performed by Ramón Bhat MD at [address] [place] [address] MAIN OR   • CYSTOSCOPY URETEROSCOPY N/A 12/5/2017    Performed by Ramón Bhat MD at [address] [place] [address] MAIN OR   • Dory De La Torre   • LASER HOLMIUM LITHOTRIPSY N/A 12/5/2017    Performed by William Ruelas Occupation:     Tobacco Use      Smoking status: Current Every Day Smoker        Packs/day: 0.50        Years: 12.00        Pack years: 6        Types: Cigarettes      Smokeless tobacco: Current User      Tobacco comment: 5 cigs a day BP Location:  Right arm   Pulse:  80   Resp:  18   Temp:  98.4 °F (36.9 °C)   TempSrc:  Oral   SpO2:  96%   Weight: 270 lb 271 lb   Height: 6' 2\" 6' 1\"        Anesthesia ROS/Med Hx and Physical Exam     Patient summary reviewed and Nursing notes reviewed

## 2018-11-30 NOTE — OPERATIVE REPORT
Operative Report    Patient Name:  Eric Nuno  MR:  [id number]  :  1985  DOS:  16    Preop Dx:  Biliary colic [J89.90]  Postop Dx:  Biliary colic [M75.74]  Procedure:  Laparoscopic cholecystectomy  Surgeon:  Rashawn Tello MD  Surgical [facility] cystic duct and artery, are seen entering the infundibulum, thus obtaining the so called \"critical view of safety\". The cystic duct and artery were clipped and divided.   The gallbladder was detached from the liver using hook cautery and delivered from t

## 2018-11-30 NOTE — INTERVAL H&P NOTE
Pre-op Diagnosis: Biliary colic [B58.17]    The above referenced H&P was reviewed by Joaquim Hernandez MD on 11/30/2018, the patient was examined and no significant changes have occurred in the patient's condition since the H&P was performed.   I discussed with the

## 2018-11-30 NOTE — ANESTHESIA POSTPROCEDURE EVALUATION
Patient: Arvilla Landau    Procedure Summary     Date:  11/30/18 Room / Location:  [address] [place] [address] MAIN OR 11 / [address] [place] [address] MAIN OR    Anesthesia Start:  0715 Anesthesia Stop:  1818    Procedure:  LAPAROSCOPIC CHOLECYSTECTOMY (N/A ) Diagnosis:       Biliary colic      (Bili

## 2018-11-30 NOTE — ANESTHESIA PROCEDURE NOTES
ANESTHESIA INTUBATION  Date/Time: 11/30/2018 7:28 AM  Urgency: elective    Airway not difficult    General Information and Staff    Patient location during procedure: OR  Anesthesiologist: Esther Maxwell MD  Resident/CRNA: PHILOMENA Recio

## 2018-12-05 ENCOUNTER — TELEPHONE (OUTPATIENT)
Dept: SURGERY | Facility: CLINIC | Age: 33
End: 2018-12-05

## 2018-12-05 NOTE — TELEPHONE ENCOUNTER
Procedure on 11/30/18 (CPT J0026352) authorized per Williams Garza RN at [facility], reference #[id number].

## 2018-12-11 ENCOUNTER — OFFICE VISIT (OUTPATIENT)
Dept: SURGERY | Facility: CLINIC | Age: 33
End: 2018-12-11
Payer: COMMERCIAL

## 2018-12-11 DIAGNOSIS — Z09 POSTOPERATIVE EXAMINATION: Primary | ICD-10-CM

## 2018-12-11 PROCEDURE — 99024 POSTOP FOLLOW-UP VISIT: CPT | Performed by: SURGERY

## 2018-12-11 PROCEDURE — 99212 OFFICE O/P EST SF 10 MIN: CPT | Performed by: SURGERY

## 2018-12-18 NOTE — PROGRESS NOTES
S:  Peter Rosas is a 26year old male sp Lap Cata  Doing well, tolerating a general diet with normal bowel habits      O:  There were no vitals taken for this visit.   GEN:  No acute distress  Abd:  Soft, NTND, incisions C/D/I    Path:  Reviewed w pt

## 2019-02-09 ENCOUNTER — OFFICE VISIT (OUTPATIENT)
Dept: FAMILY MEDICINE CLINIC | Facility: CLINIC | Age: 34
End: 2019-02-09
Payer: COMMERCIAL

## 2019-02-09 ENCOUNTER — HOSPITAL ENCOUNTER (OUTPATIENT)
Dept: GENERAL RADIOLOGY | Age: 34
Discharge: HOME | End: 2019-02-09
Attending: FAMILY MEDICINE
Payer: COMMERCIAL

## 2019-02-09 ENCOUNTER — PATIENT MESSAGE (OUTPATIENT)
Dept: OTHER | Age: 34
End: 2019-02-09

## 2019-02-09 VITALS
HEART RATE: 86 BPM | TEMPERATURE: 98 F | BODY MASS INDEX: 35.29 KG/M2 | SYSTOLIC BLOOD PRESSURE: 146 MMHG | WEIGHT: 275 LBS | DIASTOLIC BLOOD PRESSURE: 97 MMHG | HEIGHT: 74 IN

## 2019-02-09 DIAGNOSIS — J02.9 SORE THROAT: ICD-10-CM

## 2019-02-09 DIAGNOSIS — J02.9 SORE THROAT: Primary | ICD-10-CM

## 2019-02-09 LAB
CONTROL LINE PRESENT WITH A CLEAR BACKGROUND (YES/NO): YES YES/NO
KIT LOT #: NORMAL NUMERIC
STREP GRP A CUL-SCR: NEGATIVE

## 2019-02-09 PROCEDURE — 87880 STREP A ASSAY W/OPTIC: CPT | Performed by: FAMILY MEDICINE

## 2019-02-09 PROCEDURE — 99213 OFFICE O/P EST LOW 20 MIN: CPT | Performed by: FAMILY MEDICINE

## 2019-02-09 PROCEDURE — 99212 OFFICE O/P EST SF 10 MIN: CPT | Performed by: FAMILY MEDICINE

## 2019-02-09 PROCEDURE — 71046 X-RAY EXAM CHEST 2 VIEWS: CPT | Performed by: FAMILY MEDICINE

## 2019-02-09 RX ORDER — AMOXICILLIN AND CLAVULANATE POTASSIUM 875; 125 MG/1; MG/1
1 TABLET, FILM COATED ORAL 2 TIMES DAILY
Qty: 20 TABLET | Refills: 0 | Status: SHIPPED | OUTPATIENT
Start: 2019-02-09 | End: 2019-02-19

## 2019-02-09 RX ORDER — AMOXICILLIN 500 MG/1
500 CAPSULE ORAL 3 TIMES DAILY
Qty: 30 CAPSULE | Refills: 0 | Status: SHIPPED | OUTPATIENT
Start: 2019-02-09 | End: 2019-02-09

## 2019-02-09 NOTE — PROGRESS NOTES
Blood pressure (!) 146/97, pulse 86, temperature 98 °F (36.7 °C), temperature source Oral, height 6' 2\" (1.88 m), weight 275 lb (124.7 kg). Some nasal congestion cough for 2 months. Also complaining of right ear pain.   Denies any swimming or external

## 2019-02-12 ENCOUNTER — NURSE TRIAGE (OUTPATIENT)
Dept: OTHER | Age: 34
End: 2019-02-12

## 2019-02-12 ENCOUNTER — TELEPHONE (OUTPATIENT)
Dept: FAMILY MEDICINE CLINIC | Facility: CLINIC | Age: 34
End: 2019-02-12

## 2019-02-12 RX ORDER — TOBRAMYCIN 3 MG/ML
2 SOLUTION/ DROPS OPHTHALMIC EVERY 4 HOURS
Qty: 1 BOTTLE | Refills: 0 | Status: SHIPPED | OUTPATIENT
Start: 2019-02-12 | End: 2021-01-12

## 2019-02-12 NOTE — TELEPHONE ENCOUNTER
From: Melissa Viera  Sent: 2/12/2019 3:21 PM CST  To: Em Rn Triage  Subject: RE: results    ----- Message from Mychart Generic sent at 2/12/2019 3:21 PM CST -----    Thanks . I woke up with a crusty eye this morning and it's been discharging all day.  Wa

## 2019-02-12 NOTE — TELEPHONE ENCOUNTER
Action Requested: Summary for Provider     []  Critical Lab, Recommendations Needed  [] Need Additional Advice  []   FYI    []   Need Orders  [x] Need Medications Sent to Pharmacy  []  Other     SUMMARY: Patient calling with complaint of left eye crusting

## 2019-02-12 NOTE — TELEPHONE ENCOUNTER
[facility] SENT PATIENT TO FU IF NO IMPROVEMENT. PLEASE CHECK IF PATIENT WEARS CONTACT LENSES OR HAD ANY EYE TRAUMA.

## 2019-02-13 NOTE — TELEPHONE ENCOUNTER
Pt calling back. Informed pt of medication ready at pharm. Pt states he took out his contacts and has not had any trauma to his eyes.

## 2019-03-15 NOTE — PROGRESS NOTES
Blood pressure 144/90, pulse 76, resp. rate 20, height 6' 2\" (1.88 m), weight 274 lb (124.3 kg). Patient presents today reporting that he has had a an issue with violent twitches of his body since he was 4years old.   He has had neurology workups previ

## 2019-05-20 RX ORDER — BUPROPION HYDROCHLORIDE 150 MG/1
TABLET, EXTENDED RELEASE ORAL
Qty: 270 TABLET | Refills: 3 | Status: SHIPPED | OUTPATIENT
Start: 2019-05-20 | End: 2020-06-12

## 2019-05-22 ENCOUNTER — TELEPHONE (OUTPATIENT)
Dept: NEUROLOGY | Facility: CLINIC | Age: 34
End: 2019-05-22

## 2019-05-22 ENCOUNTER — OFFICE VISIT (OUTPATIENT)
Dept: NEUROLOGY | Facility: CLINIC | Age: 34
End: 2019-05-22
Payer: COMMERCIAL

## 2019-05-22 VITALS
HEART RATE: 86 BPM | WEIGHT: 274 LBS | DIASTOLIC BLOOD PRESSURE: 91 MMHG | HEIGHT: 74 IN | BODY MASS INDEX: 35.16 KG/M2 | RESPIRATION RATE: 17 BRPM | SYSTOLIC BLOOD PRESSURE: 134 MMHG

## 2019-05-22 DIAGNOSIS — M54.2 NECK PAIN: ICD-10-CM

## 2019-05-22 DIAGNOSIS — R25.9 ABNORMAL INVOLUNTARY MOVEMENT: Primary | ICD-10-CM

## 2019-05-22 PROCEDURE — 99244 OFF/OP CNSLTJ NEW/EST MOD 40: CPT | Performed by: OTHER

## 2019-05-22 RX ORDER — GUANFACINE 1 MG/1
TABLET, EXTENDED RELEASE ORAL
Qty: 60 TABLET | Refills: 3 | Status: SHIPPED | OUTPATIENT
Start: 2019-05-22 | End: 2019-09-13

## 2019-05-22 NOTE — TELEPHONE ENCOUNTER
[employer] for authorization of approval for MRI brain wo cpt code 51733. Approval was given with Authorization # [id number] effective 05/22/19 to 06/20/19. Will call Pt. To inform. Pt. informed of approval. He will call to schedule appt.

## 2019-05-22 NOTE — PROGRESS NOTES
Neurology Outpatient Consult Note    Geo Burgos : 1985   Referring Physician: Dr. Coby Rodriguez  HPI:     Geo Burgos is a 26year old male who is being seen in neurologic evaluation.     Patient being seen in evaluation for abnormal inv Solution Apply 2 drops to eye every 4 (four) hours. Disp: 1 Bottle Rfl: 0   LANSOPRAZOLE 30 MG Oral Capsule Delayed Release TAKE ONE CAPSULE BY MOUTH TWICE DAILY Disp: 180 capsule Rfl: 3   Multiple Vitamin (MULTI-VITAMINS) Oral Tab Take  by mouth.  Disp:  R of children: 2      Years of education: Not on file      Highest education level: Not on file    Occupational History      Occupation:     Tobacco Use      Smoking status: Former Smoker        Packs/day: 0.50        Years: 12.00        Pa dysarthria or dysphonia  Motor: 5/5 strength proximally and distally; normal bulk and tone; no drift  Sensory: intact to light touch and pinprick throughout  Reflexes: DTRs 2+ in bilateral biceps, brachioradialis, patella, toes downgoing bilaterally, no an

## 2019-05-30 ENCOUNTER — HOSPITAL ENCOUNTER (OUTPATIENT)
Age: 34
Discharge: HOME | End: 2019-05-30
Attending: EMERGENCY MEDICINE
Payer: OTHER MISCELLANEOUS

## 2019-05-30 ENCOUNTER — APPOINTMENT (OUTPATIENT)
Dept: GENERAL RADIOLOGY | Age: 34
End: 2019-05-30
Attending: EMERGENCY MEDICINE
Payer: OTHER MISCELLANEOUS

## 2019-05-30 VITALS
HEART RATE: 100 BPM | BODY MASS INDEX: 34.01 KG/M2 | WEIGHT: 265 LBS | TEMPERATURE: 98 F | HEIGHT: 74 IN | SYSTOLIC BLOOD PRESSURE: 143 MMHG | DIASTOLIC BLOOD PRESSURE: 86 MMHG | RESPIRATION RATE: 20 BRPM | OXYGEN SATURATION: 99 %

## 2019-05-30 DIAGNOSIS — M25.571 ACUTE RIGHT ANKLE PAIN: Primary | ICD-10-CM

## 2019-05-30 DIAGNOSIS — S83.91XA SPRAIN OF RIGHT KNEE, UNSPECIFIED LIGAMENT, INITIAL ENCOUNTER: ICD-10-CM

## 2019-05-30 PROCEDURE — 73610 X-RAY EXAM OF ANKLE: CPT | Performed by: EMERGENCY MEDICINE

## 2019-05-30 PROCEDURE — 99214 OFFICE O/P EST MOD 30 MIN: CPT

## 2019-05-30 PROCEDURE — 73562 X-RAY EXAM OF KNEE 3: CPT | Performed by: EMERGENCY MEDICINE

## 2019-05-30 NOTE — ED PROVIDER NOTES
Patient Seen in: [address]    History   Patient presents with:   Worker's Comp  Lower Extremity Injury (musculoskeletal)    Stated Complaint: WC-RT KNEE INJURED WITH CONCRETE    HPI    The patient is a 15-year-old male wi Performed by Milana Eisenberg MD at [facility] OR   • LASER HOLMIUM LITHOTRIPSY N/A 12/5/2017    Performed by Patricia Aponte MD at [facility] OR       Family history reviewed and is not pertinent to presenting problem.     Social History    Tobacco Use      Sm Impression:  Acute right ankle pain  (primary encounter diagnosis)  Sprain of right knee, unspecified ligament, initial encounter    Disposition:  Discharge  5/30/2019  3:27 pm    Follow-up:  Yohan Baez[address] [place] [address]  Christiana Larsen

## 2019-05-30 NOTE — ED INITIAL ASSESSMENT (HPI)
PATIENT REPORTS TRIPPING OVER A 4X4 AND FALLING ONTO HIS RIGHT KNEE YESTERDAY WHILE AT WORK.  + TENDERNESS AND SWELLING TO HIS RIGHT KNEE NOTED.

## 2019-06-03 ENCOUNTER — APPOINTMENT (OUTPATIENT)
Dept: OTHER | Facility: HOSPITAL | Age: 34
End: 2019-06-03
Attending: EMERGENCY MEDICINE

## 2019-06-03 ENCOUNTER — TELEPHONE (OUTPATIENT)
Dept: PHYSICAL THERAPY | Facility: HOSPITAL | Age: 34
End: 2019-06-03

## 2019-06-05 ENCOUNTER — TELEPHONE (OUTPATIENT)
Dept: NEUROLOGY | Facility: CLINIC | Age: 34
End: 2019-06-05

## 2019-06-07 ENCOUNTER — TELEPHONE (OUTPATIENT)
Dept: NEUROLOGY | Facility: CLINIC | Age: 34
End: 2019-06-07

## 2019-06-07 NOTE — TELEPHONE ENCOUNTER
MRI brain without contrast, molecular imaging, 6/4/2019; results reviewed; mucosal thickening in bilateral mastoid air cells, needs clinical correlation. No intracranial abnormalities. Results sent over my chart re-same.

## 2019-06-11 ENCOUNTER — TELEPHONE (OUTPATIENT)
Dept: NEUROLOGY | Facility: CLINIC | Age: 34
End: 2019-06-11

## 2019-06-11 NOTE — TELEPHONE ENCOUNTER
Pt dropped off images on CD for Dr. Manjeet Gutiérrez. Date on CD shows a 6/4/19. Images are MRI of Brain. Put CD in nurses bin.

## 2019-06-12 ENCOUNTER — MED REC SCAN ONLY (OUTPATIENT)
Dept: NEUROLOGY | Facility: CLINIC | Age: 34
End: 2019-06-12

## 2019-06-18 ENCOUNTER — APPOINTMENT (OUTPATIENT)
Dept: OTHER | Facility: HOSPITAL | Age: 34
End: 2019-06-18
Attending: ORTHOPAEDIC SURGERY

## 2019-06-19 ENCOUNTER — APPOINTMENT (OUTPATIENT)
Dept: OTHER | Facility: HOSPITAL | Age: 34
End: 2019-06-19
Attending: EMERGENCY MEDICINE

## 2019-07-24 ENCOUNTER — OFFICE VISIT (OUTPATIENT)
Dept: FAMILY MEDICINE CLINIC | Facility: CLINIC | Age: 34
End: 2019-07-24
Payer: COMMERCIAL

## 2019-07-24 VITALS
WEIGHT: 280 LBS | DIASTOLIC BLOOD PRESSURE: 87 MMHG | HEART RATE: 88 BPM | BODY MASS INDEX: 35.94 KG/M2 | SYSTOLIC BLOOD PRESSURE: 130 MMHG | HEIGHT: 74 IN

## 2019-07-24 DIAGNOSIS — L98.9 SKIN LESION: Primary | ICD-10-CM

## 2019-07-24 PROCEDURE — 99212 OFFICE O/P EST SF 10 MIN: CPT | Performed by: FAMILY MEDICINE

## 2019-07-24 NOTE — PROGRESS NOTES
Blood pressure 130/87, pulse 88, height 6' 2\" (1.88 m), weight 280 lb (127 kg). Patient presents today complaining of a growth on the right side of the neck. He is a smoker. He has had a for 2 months. No fever chills or vomiting.   No dysphasia no pa

## 2019-07-29 ENCOUNTER — OFFICE VISIT (OUTPATIENT)
Dept: FAMILY MEDICINE CLINIC | Facility: CLINIC | Age: 34
End: 2019-07-29
Payer: COMMERCIAL

## 2019-07-29 VITALS
WEIGHT: 285 LBS | DIASTOLIC BLOOD PRESSURE: 83 MMHG | HEIGHT: 74 IN | BODY MASS INDEX: 36.57 KG/M2 | SYSTOLIC BLOOD PRESSURE: 125 MMHG | RESPIRATION RATE: 18 BRPM | HEART RATE: 91 BPM | TEMPERATURE: 98 F

## 2019-07-29 DIAGNOSIS — H65.01 NON-RECURRENT ACUTE SEROUS OTITIS MEDIA OF RIGHT EAR: Primary | ICD-10-CM

## 2019-07-29 PROCEDURE — 99213 OFFICE O/P EST LOW 20 MIN: CPT | Performed by: NURSE PRACTITIONER

## 2019-07-29 RX ORDER — IBUPROFEN 800 MG/1
800 TABLET ORAL EVERY 8 HOURS PRN
Qty: 30 TABLET | Refills: 0 | Status: ON HOLD | OUTPATIENT
Start: 2019-07-29 | End: 2019-09-16

## 2019-07-29 RX ORDER — AMOXICILLIN AND CLAVULANATE POTASSIUM 875; 125 MG/1; MG/1
1 TABLET, FILM COATED ORAL 2 TIMES DAILY
Qty: 14 TABLET | Refills: 0 | Status: SHIPPED | OUTPATIENT
Start: 2019-07-29 | End: 2019-08-05

## 2019-07-29 NOTE — PROGRESS NOTES
HPI    Patient presents for right ear pain and ringing x 1 day. Tried some otc homeopathic ear drops and did not help. Denies fever. Has taken ibuprofen for pain without relief. Could not sleep last night. Pain constant and sharp.      Review of System needs:         Medical: Not on file        Non-medical: Not on file    Tobacco Use      Smoking status: Former Smoker        Packs/day: 0.50        Years: 12.00        Pack years: 6        Types: Cigarettes        Quit date: 4/15/2019        Years since Lakshmi Treatments: No        Regular use of sun block: Not Asked    Social History Narrative      The patient does not use an assistive device. .        The patient does live in a home with stairs.         Current Outpatient Medications:  ibuprofen 800 MG Oral Tab Psychiatric: He has a normal mood and affect.  His behavior is normal. Judgment and thought content normal.       Assessment and Plan:   Problem List Items Addressed This Visit        Infectious/Inflammatory    Non-recurrent acute serous otitis media of r

## 2019-07-29 NOTE — ASSESSMENT & PLAN NOTE
Augmentin 875/125 mg po bid x 7 days. Ibuprofen 800 mg po q 8 hrs prn for pain. Urged to take a eat with antibiotic and take a probiotic daily while on antibiotics to try to prevent diarrhea and gi upset. Supportive care discussed.   Patient verbalize un

## 2019-08-22 ENCOUNTER — OFFICE VISIT (OUTPATIENT)
Dept: OTOLARYNGOLOGY | Facility: CLINIC | Age: 34
End: 2019-08-22
Payer: COMMERCIAL

## 2019-08-22 VITALS
BODY MASS INDEX: 36.57 KG/M2 | WEIGHT: 285 LBS | TEMPERATURE: 98 F | HEIGHT: 74 IN | DIASTOLIC BLOOD PRESSURE: 91 MMHG | SYSTOLIC BLOOD PRESSURE: 144 MMHG

## 2019-08-22 DIAGNOSIS — L72.0 EPIDERMAL CYST OF NECK: Primary | ICD-10-CM

## 2019-08-22 PROCEDURE — 99244 OFF/OP CNSLTJ NEW/EST MOD 40: CPT | Performed by: OTOLARYNGOLOGY

## 2019-08-22 NOTE — PROGRESS NOTES
Sanjay Verma is a 37year old male. Patient presents with:  Bump: bump/lesion under his chin for 2 months      HISTORY OF PRESENT ILLNESS  He presents with 2-month history of enlarging area of abnormal skin under his jawline on the right.   No pain or Calculus of kidney    • Cellulitis due to MRSA     left knee   • Esophageal reflux    • Nephrolithiasis     cystoscopy   • Tonsillitis    • Torn meniscus     arthroscopy   • Visual impairment     wears contact lenses       Past Surgical History:   Procedur Constitutional Normal Overall appearance - Normal.   Psychiatric Normal Orientation - Oriented to time, place, person & situation. Appropriate mood and affect.    Neck Exam Normal Inspection -2 x 2 cm area of firm skin with what appears to be dermal cysts capsule, Rfl: 3  •  Multiple Vitamin (MULTI-VITAMINS) Oral Tab, Take  by mouth., Disp: , Rfl:   ASSESSMENT AND PLAN    1. Epidermal cyst of neck  This appears to be a benign process of the skin. Does not extend deep to the skin.   I have recommended excisi

## 2019-08-28 ENCOUNTER — TELEPHONE (OUTPATIENT)
Dept: NEUROLOGY | Facility: CLINIC | Age: 34
End: 2019-08-28

## 2019-08-28 ENCOUNTER — OFFICE VISIT (OUTPATIENT)
Dept: NEUROLOGY | Facility: CLINIC | Age: 34
End: 2019-08-28
Payer: COMMERCIAL

## 2019-08-28 VITALS
HEIGHT: 74 IN | DIASTOLIC BLOOD PRESSURE: 76 MMHG | SYSTOLIC BLOOD PRESSURE: 116 MMHG | BODY MASS INDEX: 34.65 KG/M2 | HEART RATE: 100 BPM | WEIGHT: 270 LBS

## 2019-08-28 DIAGNOSIS — G24.3 CERVICAL DYSTONIA: ICD-10-CM

## 2019-08-28 DIAGNOSIS — R25.9 ABNORMAL INVOLUNTARY MOVEMENT: Primary | ICD-10-CM

## 2019-08-28 PROCEDURE — 99213 OFFICE O/P EST LOW 20 MIN: CPT | Performed by: OTHER

## 2019-08-28 NOTE — TELEPHONE ENCOUNTER
Called [facility] for authorization of approval of Botox 100 u/vl cpt codes [id number], [id number] effective 08/28/19. Talked to  Mark Mccain. who initiated request. Raulito Major. Reference #[id number], will fax clinical notes. Will ask PHYLLIS Ayoub to fax clinical notes to [facility] wit

## 2019-08-28 NOTE — PROGRESS NOTES
[facility]    Leeann Avila is a 37year old male. HPI:     Pt being seen in follow up. I saw him in clinic last in May of this year. Since last visit, he continues to have intermittent episodes of involuntary movements. BMI 34.67 kg/m²    General: no apparent distress, pleasant and cooperative   CV: regular rate and rhythm   Lungs: clear to auscultation bilaterally  HEENT/neck: slight left shoulder elevation w/ perhaps very mild right torti / lateral aide  Neuro:  Men

## 2019-08-28 NOTE — TELEPHONE ENCOUNTER
[employer] for authorization of approval for MRI C-spine w/wo cpt code 79204. Approval was given withAuthorization # [id number] effective 08/28/19 to 09/26/19   Will call Pt. To inform. L/m advising of approval. Can proceed with scheduling appt.

## 2019-08-29 NOTE — TELEPHONE ENCOUNTER
Faxed clinical notes & order to [facility]  for authorization of Botox 100 u/vl pending approval. Reference [facility]#[id number]. Will inform ones Botox is approved.

## 2019-09-04 NOTE — TELEPHONE ENCOUNTER
Called [facility] to check on status for authorization of approval of Botox 100 u/vl cpt codes , 59405 effective 08/28/19. [address] [place] [address]. Reference [id number]. T/t Rin DIETRICH   who states it was approved.  Authorization # [id number] for 4 units/DOS effectiv

## 2019-09-13 VITALS — BODY MASS INDEX: 34.01 KG/M2 | WEIGHT: 265 LBS | HEIGHT: 74 IN

## 2019-09-16 ENCOUNTER — HOSPITAL ENCOUNTER (OUTPATIENT)
Facility: HOSPITAL | Age: 34
Setting detail: HOSPITAL OUTPATIENT SURGERY
Discharge: HOME | End: 2019-09-16
Attending: OTOLARYNGOLOGY | Admitting: OTOLARYNGOLOGY
Payer: COMMERCIAL

## 2019-09-16 DIAGNOSIS — L72.0 EPIDERMAL CYST OF NECK: ICD-10-CM

## 2019-09-16 PROBLEM — D23.4 BENIGN NEOPLASM OF SKIN OF NECK: Status: RESOLVED | Noted: 2019-09-16 | Resolved: 2019-09-16

## 2019-09-16 PROBLEM — D23.4 BENIGN NEOPLASM OF SKIN OF NECK: Status: ACTIVE | Noted: 2019-09-16

## 2019-09-16 PROCEDURE — 11422 EXC H-F-NK-SP B9+MARG 1.1-2: CPT | Performed by: OTOLARYNGOLOGY

## 2019-09-16 PROCEDURE — 13132 CMPLX RPR F/C/C/M/N/AX/G/H/F: CPT | Performed by: OTOLARYNGOLOGY

## 2019-09-16 PROCEDURE — 0HB4XZZ EXCISION OF NECK SKIN, EXTERNAL APPROACH: ICD-10-PCS | Performed by: OTOLARYNGOLOGY

## 2019-09-16 RX ORDER — ACETAMINOPHEN 325 MG/1
650 TABLET ORAL EVERY 4 HOURS PRN
Status: CANCELLED | OUTPATIENT
Start: 2019-09-16

## 2019-09-16 RX ORDER — LIDOCAINE HYDROCHLORIDE AND EPINEPHRINE 10; 10 MG/ML; UG/ML
INJECTION, SOLUTION INFILTRATION; PERINEURAL AS NEEDED
Status: DISCONTINUED | OUTPATIENT
Start: 2019-09-16 | End: 2019-09-16 | Stop reason: HOSPADM

## 2019-09-16 RX ORDER — HYDROCODONE BITARTRATE AND ACETAMINOPHEN 5; 325 MG/1; MG/1
2 TABLET ORAL EVERY 4 HOURS PRN
Status: CANCELLED | OUTPATIENT
Start: 2019-09-16

## 2019-09-16 RX ORDER — HYDROCODONE BITARTRATE AND ACETAMINOPHEN 5; 325 MG/1; MG/1
1-2 TABLET ORAL EVERY 6 HOURS PRN
Qty: 15 TABLET | Refills: 0 | Status: ON HOLD | OUTPATIENT
Start: 2019-09-16 | End: 2019-11-12

## 2019-09-16 RX ORDER — CEPHALEXIN 500 MG/1
500 CAPSULE ORAL EVERY 8 HOURS
Qty: 21 CAPSULE | Refills: 0 | Status: ON HOLD | OUTPATIENT
Start: 2019-09-16 | End: 2019-11-12

## 2019-09-16 RX ORDER — GUANFACINE 1 MG/1
TABLET, EXTENDED RELEASE ORAL
Qty: 60 TABLET | Refills: 0 | OUTPATIENT
Start: 2019-09-16

## 2019-09-16 RX ORDER — HYDROCODONE BITARTRATE AND ACETAMINOPHEN 5; 325 MG/1; MG/1
1 TABLET ORAL EVERY 4 HOURS PRN
Status: CANCELLED | OUTPATIENT
Start: 2019-09-16

## 2019-09-16 NOTE — INTERVAL H&P NOTE
Pre-op Diagnosis: Epidermal cyst of neck [L72.0]    The above referenced H&P was reviewed by Yakelin Stack MD on 9/16/2019, the patient was examined and no significant changes have occurred in the patient's condition since the H&P was performed.   I discu

## 2019-09-16 NOTE — H&P
HISTORY OF PRESENT ILLNESS  He presents with 2-month history of enlarging area of abnormal skin under his jawline on the right. No pain or discomfort or drainage. He does note that is been getting bigger and started out as a small pimple-like structure. Nephrolithiasis       cystoscopy   • Tonsillitis     • Torn meniscus       arthroscopy   • Visual impairment       wears contact lenses               Past Surgical History:   Procedure Laterality Date   • ARTHROSCOPY OF JOINT UNLISTED Left 2009 - knee   • Psychiatric Normal Orientation - Oriented to time, place, person & situation. Appropriate mood and affect. Neck Exam Normal Inspection -2 x 2 cm area of firm skin with what appears to be dermal cysts.   Right side of neck palpation - Normal. Parotid gla (MULTI-VITAMINS) Oral Tab, Take  by mouth., Disp: , Rfl:   ASSESSMENT AND PLAN     1. Epidermal cyst of neck  This appears to be a benign process of the skin. Does not extend deep to the skin. I have recommended excision and complex layer closure.   We di

## 2019-09-16 NOTE — BRIEF OP NOTE
Pre-Operative Diagnosis: Epidermal cyst of neck [L72.0]     Post-Operative Diagnosis:same  Procedure Performed:   Procedure(s):  excision and closure of right neck cyst    Surgeon(s) and Role:     Alek Ken MD - Primary     Estimated Blood Loss:5ml

## 2019-09-17 ENCOUNTER — TELEPHONE (OUTPATIENT)
Dept: OTOLARYNGOLOGY | Facility: CLINIC | Age: 34
End: 2019-09-17

## 2019-09-17 NOTE — TELEPHONE ENCOUNTER
Pt is post op day 1 right neck cyst excision and closure. Per pt doing well post op, incision is dry and intact, pt taking antibiotic as prescribed. Advised pt to contact our office if symptoms change worsen (fever, bleeding discharge from incision).  Pt ve

## 2019-09-26 ENCOUNTER — OFFICE VISIT (OUTPATIENT)
Dept: OTOLARYNGOLOGY | Facility: CLINIC | Age: 34
End: 2019-09-26
Payer: COMMERCIAL

## 2019-09-26 VITALS
DIASTOLIC BLOOD PRESSURE: 81 MMHG | WEIGHT: 270 LBS | BODY MASS INDEX: 34.65 KG/M2 | TEMPERATURE: 97 F | HEIGHT: 74 IN | SYSTOLIC BLOOD PRESSURE: 117 MMHG

## 2019-09-26 DIAGNOSIS — L72.0 EPIDERMAL CYST OF NECK: Primary | ICD-10-CM

## 2019-09-26 PROCEDURE — 99024 POSTOP FOLLOW-UP VISIT: CPT | Performed by: OTOLARYNGOLOGY

## 2019-09-26 NOTE — PROGRESS NOTES
Hemant Baptiste is a 37year old male.   Patient presents with:  Post-Op: s/p excision and closure of right neck cyst      HISTORY OF PRESENT ILLNESS  He presents with 2-month history of enlarging area of abnormal skin under his jawline on the right.  No p NERVE SHEATH TUMOR   • BRCA gene + Neg         sister       Past Medical History:   Diagnosis Date   • Anxiety state    • Calculus of kidney    • Cellulitis due to MRSA     left knee   • Esophageal reflux    • Migraines    • Nephrolithiasis     cystoscopy intolerance. Neuro Negative Tremors. Psych Negative Anxiety and depression. Integumentary Negative Frequent skin infections, pigment change and rash. Hema/Lymph Negative Easy bleeding and easy bruising.            PHYSICAL EXAM    /81   Temp 9 CAPSULE BY MOUTH TWICE DAILY, Disp: 180 capsule, Rfl: 3  •  Multiple Vitamin (MULTI-VITAMINS) Oral Tab, Take  by mouth., Disp: , Rfl:   •  cephALEXin 500 MG Oral Cap, Take 1 capsule (500 mg total) by mouth every 8 (eight) hours. , Disp: 21 capsule, Rfl: 0

## 2019-09-30 NOTE — OPERATIVE REPORT
[facility]    PATIENT'S NAME: Eleni HERNANDEZ   ATTENDING PHYSICIAN: Sam Up MD   OPERATING PHYSICIAN: Dandre Mckinnon.  Jose Up MD   PATIENT ACCOUNT#:   [id number]    LOCATION:  [facility] 10  MEDICAL RECORD #:   [id number]       DATE OF

## 2019-11-11 ENCOUNTER — HOSPITAL ENCOUNTER (INPATIENT)
Facility: HOSPITAL | Age: 34
LOS: 1 days | Discharge: HOME | DRG: 343 | End: 2019-11-12
Attending: EMERGENCY MEDICINE | Admitting: HOSPITALIST
Payer: COMMERCIAL

## 2019-11-11 ENCOUNTER — APPOINTMENT (OUTPATIENT)
Dept: CT IMAGING | Facility: HOSPITAL | Age: 34
DRG: 343 | End: 2019-11-11
Attending: EMERGENCY MEDICINE
Payer: COMMERCIAL

## 2019-11-11 ENCOUNTER — HOSPITAL ENCOUNTER (OUTPATIENT)
Age: 34
Discharge: HOME | End: 2019-11-11
Attending: EMERGENCY MEDICINE
Payer: COMMERCIAL

## 2019-11-11 VITALS
HEIGHT: 74 IN | WEIGHT: 275 LBS | RESPIRATION RATE: 20 BRPM | DIASTOLIC BLOOD PRESSURE: 99 MMHG | SYSTOLIC BLOOD PRESSURE: 147 MMHG | BODY MASS INDEX: 35.29 KG/M2 | HEART RATE: 90 BPM | OXYGEN SATURATION: 100 % | TEMPERATURE: 98 F

## 2019-11-11 DIAGNOSIS — R10.31 ABDOMINAL PAIN, RIGHT LOWER QUADRANT: Primary | ICD-10-CM

## 2019-11-11 DIAGNOSIS — K37 APPENDICITIS: ICD-10-CM

## 2019-11-11 DIAGNOSIS — K35.80 ACUTE APPENDICITIS, UNSPECIFIED ACUTE APPENDICITIS TYPE: Primary | ICD-10-CM

## 2019-11-11 PROCEDURE — 74177 CT ABD & PELVIS W/CONTRAST: CPT | Performed by: EMERGENCY MEDICINE

## 2019-11-11 PROCEDURE — 99222 1ST HOSP IP/OBS MODERATE 55: CPT | Performed by: HOSPITALIST

## 2019-11-11 PROCEDURE — 99212 OFFICE O/P EST SF 10 MIN: CPT

## 2019-11-11 RX ORDER — MORPHINE SULFATE 4 MG/ML
2 INJECTION, SOLUTION INTRAMUSCULAR; INTRAVENOUS ONCE
Status: COMPLETED | OUTPATIENT
Start: 2019-11-11 | End: 2019-11-11

## 2019-11-11 RX ORDER — ONDANSETRON 2 MG/ML
4 INJECTION INTRAMUSCULAR; INTRAVENOUS ONCE
Status: COMPLETED | OUTPATIENT
Start: 2019-11-11 | End: 2019-11-11

## 2019-11-11 RX ORDER — MORPHINE SULFATE 4 MG/ML
4 INJECTION, SOLUTION INTRAMUSCULAR; INTRAVENOUS ONCE
Status: COMPLETED | OUTPATIENT
Start: 2019-11-11 | End: 2019-11-11

## 2019-11-12 ENCOUNTER — ANESTHESIA (OUTPATIENT)
Dept: SURGERY | Facility: HOSPITAL | Age: 34
DRG: 343 | End: 2019-11-12
Payer: COMMERCIAL

## 2019-11-12 ENCOUNTER — ANESTHESIA EVENT (OUTPATIENT)
Dept: SURGERY | Facility: HOSPITAL | Age: 34
DRG: 343 | End: 2019-11-12
Payer: COMMERCIAL

## 2019-11-12 VITALS
DIASTOLIC BLOOD PRESSURE: 87 MMHG | RESPIRATION RATE: 14 BRPM | BODY MASS INDEX: 36.49 KG/M2 | HEIGHT: 74 IN | TEMPERATURE: 99 F | OXYGEN SATURATION: 95 % | WEIGHT: 284.31 LBS | SYSTOLIC BLOOD PRESSURE: 133 MMHG | HEART RATE: 78 BPM

## 2019-11-12 DIAGNOSIS — K37 APPENDICITIS: Primary | ICD-10-CM

## 2019-11-12 PROCEDURE — 99254 IP/OBS CNSLTJ NEW/EST MOD 60: CPT | Performed by: SURGERY

## 2019-11-12 PROCEDURE — 3E023GC INTRODUCTION OF OTHER THERAPEUTIC SUBSTANCE INTO MUSCLE, PERCUTANEOUS APPROACH: ICD-10-PCS | Performed by: HOSPITALIST

## 2019-11-12 PROCEDURE — 44970 LAPAROSCOPY APPENDECTOMY: CPT | Performed by: SURGERY

## 2019-11-12 PROCEDURE — 0DTJ4ZZ RESECTION OF APPENDIX, PERCUTANEOUS ENDOSCOPIC APPROACH: ICD-10-PCS | Performed by: SURGERY

## 2019-11-12 PROCEDURE — 99239 HOSP IP/OBS DSCHRG MGMT >30: CPT | Performed by: HOSPITALIST

## 2019-11-12 RX ORDER — HYDROMORPHONE HYDROCHLORIDE 1 MG/ML
0.2 INJECTION, SOLUTION INTRAMUSCULAR; INTRAVENOUS; SUBCUTANEOUS EVERY 5 MIN PRN
Status: DISCONTINUED | OUTPATIENT
Start: 2019-11-12 | End: 2019-11-12 | Stop reason: HOSPADM

## 2019-11-12 RX ORDER — ONDANSETRON 2 MG/ML
4 INJECTION INTRAMUSCULAR; INTRAVENOUS EVERY 6 HOURS PRN
Status: DISCONTINUED | OUTPATIENT
Start: 2019-11-12 | End: 2019-11-12

## 2019-11-12 RX ORDER — HYDROCODONE BITARTRATE AND ACETAMINOPHEN 7.5; 325 MG/1; MG/1
1-2 TABLET ORAL EVERY 4 HOURS PRN
Qty: 12 TABLET | Refills: 0 | Status: SHIPPED | OUTPATIENT
Start: 2019-11-12 | End: 2021-01-12

## 2019-11-12 RX ORDER — IBUPROFEN 600 MG/1
600 TABLET ORAL EVERY 8 HOURS PRN
Qty: 30 TABLET | Refills: 0 | Status: SHIPPED | OUTPATIENT
Start: 2019-11-12 | End: 2021-01-12

## 2019-11-12 RX ORDER — HYDROMORPHONE HYDROCHLORIDE 1 MG/ML
0.4 INJECTION, SOLUTION INTRAMUSCULAR; INTRAVENOUS; SUBCUTANEOUS EVERY 5 MIN PRN
Status: DISCONTINUED | OUTPATIENT
Start: 2019-11-12 | End: 2019-11-12 | Stop reason: HOSPADM

## 2019-11-12 RX ORDER — MORPHINE SULFATE 2 MG/ML
2 INJECTION, SOLUTION INTRAMUSCULAR; INTRAVENOUS EVERY 2 HOUR PRN
Status: DISCONTINUED | OUTPATIENT
Start: 2019-11-12 | End: 2019-11-12

## 2019-11-12 RX ORDER — ONDANSETRON 2 MG/ML
4 INJECTION INTRAMUSCULAR; INTRAVENOUS ONCE AS NEEDED
Status: DISCONTINUED | OUTPATIENT
Start: 2019-11-12 | End: 2019-11-12 | Stop reason: HOSPADM

## 2019-11-12 RX ORDER — MIDAZOLAM HYDROCHLORIDE 1 MG/ML
INJECTION INTRAMUSCULAR; INTRAVENOUS AS NEEDED
Status: DISCONTINUED | OUTPATIENT
Start: 2019-11-12 | End: 2019-11-12 | Stop reason: SURG

## 2019-11-12 RX ORDER — PROCHLORPERAZINE EDISYLATE 5 MG/ML
5 INJECTION INTRAMUSCULAR; INTRAVENOUS ONCE AS NEEDED
Status: DISCONTINUED | OUTPATIENT
Start: 2019-11-12 | End: 2019-11-12 | Stop reason: HOSPADM

## 2019-11-12 RX ORDER — GLYCOPYRROLATE 0.2 MG/ML
INJECTION INTRAMUSCULAR; INTRAVENOUS AS NEEDED
Status: DISCONTINUED | OUTPATIENT
Start: 2019-11-12 | End: 2019-11-12 | Stop reason: SURG

## 2019-11-12 RX ORDER — MORPHINE SULFATE 4 MG/ML
4 INJECTION, SOLUTION INTRAMUSCULAR; INTRAVENOUS EVERY 2 HOUR PRN
Status: DISCONTINUED | OUTPATIENT
Start: 2019-11-12 | End: 2019-11-12

## 2019-11-12 RX ORDER — HYDROCODONE BITARTRATE AND ACETAMINOPHEN 5; 325 MG/1; MG/1
2 TABLET ORAL AS NEEDED
Status: DISCONTINUED | OUTPATIENT
Start: 2019-11-12 | End: 2019-11-12 | Stop reason: HOSPADM

## 2019-11-12 RX ORDER — NALOXONE HYDROCHLORIDE 0.4 MG/ML
80 INJECTION, SOLUTION INTRAMUSCULAR; INTRAVENOUS; SUBCUTANEOUS AS NEEDED
Status: DISCONTINUED | OUTPATIENT
Start: 2019-11-12 | End: 2019-11-12 | Stop reason: HOSPADM

## 2019-11-12 RX ORDER — LIDOCAINE HYDROCHLORIDE 20 MG/ML
INJECTION, SOLUTION EPIDURAL; INFILTRATION; INTRACAUDAL; PERINEURAL AS NEEDED
Status: DISCONTINUED | OUTPATIENT
Start: 2019-11-12 | End: 2019-11-12 | Stop reason: SURG

## 2019-11-12 RX ORDER — HYDROCODONE BITARTRATE AND ACETAMINOPHEN 7.5; 325 MG/1; MG/1
1-2 TABLET ORAL EVERY 4 HOURS PRN
Status: DISCONTINUED | OUTPATIENT
Start: 2019-11-12 | End: 2019-11-12

## 2019-11-12 RX ORDER — DEXAMETHASONE SODIUM PHOSPHATE 4 MG/ML
VIAL (ML) INJECTION AS NEEDED
Status: DISCONTINUED | OUTPATIENT
Start: 2019-11-12 | End: 2019-11-12 | Stop reason: SURG

## 2019-11-12 RX ORDER — HYDROMORPHONE HYDROCHLORIDE 1 MG/ML
0.8 INJECTION, SOLUTION INTRAMUSCULAR; INTRAVENOUS; SUBCUTANEOUS EVERY 2 HOUR PRN
Status: DISCONTINUED | OUTPATIENT
Start: 2019-11-12 | End: 2019-11-12

## 2019-11-12 RX ORDER — SODIUM CHLORIDE, SODIUM LACTATE, POTASSIUM CHLORIDE, CALCIUM CHLORIDE 600; 310; 30; 20 MG/100ML; MG/100ML; MG/100ML; MG/100ML
INJECTION, SOLUTION INTRAVENOUS CONTINUOUS
Status: DISCONTINUED | OUTPATIENT
Start: 2019-11-12 | End: 2019-11-12 | Stop reason: HOSPADM

## 2019-11-12 RX ORDER — HYDROCODONE BITARTRATE AND ACETAMINOPHEN 5; 325 MG/1; MG/1
1 TABLET ORAL AS NEEDED
Status: DISCONTINUED | OUTPATIENT
Start: 2019-11-12 | End: 2019-11-12 | Stop reason: HOSPADM

## 2019-11-12 RX ORDER — MORPHINE SULFATE 4 MG/ML
4 INJECTION, SOLUTION INTRAMUSCULAR; INTRAVENOUS EVERY 10 MIN PRN
Status: DISCONTINUED | OUTPATIENT
Start: 2019-11-12 | End: 2019-11-12 | Stop reason: HOSPADM

## 2019-11-12 RX ORDER — HYDROMORPHONE HYDROCHLORIDE 1 MG/ML
0.6 INJECTION, SOLUTION INTRAMUSCULAR; INTRAVENOUS; SUBCUTANEOUS EVERY 5 MIN PRN
Status: DISCONTINUED | OUTPATIENT
Start: 2019-11-12 | End: 2019-11-12 | Stop reason: HOSPADM

## 2019-11-12 RX ORDER — SODIUM CHLORIDE, SODIUM LACTATE, POTASSIUM CHLORIDE, CALCIUM CHLORIDE 600; 310; 30; 20 MG/100ML; MG/100ML; MG/100ML; MG/100ML
INJECTION, SOLUTION INTRAVENOUS CONTINUOUS
Status: DISCONTINUED | OUTPATIENT
Start: 2019-11-12 | End: 2019-11-12

## 2019-11-12 RX ORDER — BUPIVACAINE HYDROCHLORIDE 2.5 MG/ML
INJECTION, SOLUTION EPIDURAL; INFILTRATION; INTRACAUDAL AS NEEDED
Status: DISCONTINUED | OUTPATIENT
Start: 2019-11-12 | End: 2019-11-12

## 2019-11-12 RX ORDER — HYDROMORPHONE HYDROCHLORIDE 1 MG/ML
1.2 INJECTION, SOLUTION INTRAMUSCULAR; INTRAVENOUS; SUBCUTANEOUS EVERY 2 HOUR PRN
Status: DISCONTINUED | OUTPATIENT
Start: 2019-11-12 | End: 2019-11-12

## 2019-11-12 RX ORDER — ROCURONIUM BROMIDE 10 MG/ML
INJECTION, SOLUTION INTRAVENOUS AS NEEDED
Status: DISCONTINUED | OUTPATIENT
Start: 2019-11-12 | End: 2019-11-12 | Stop reason: SURG

## 2019-11-12 RX ORDER — HYDRALAZINE HYDROCHLORIDE 20 MG/ML
10 INJECTION INTRAMUSCULAR; INTRAVENOUS EVERY 4 HOURS PRN
Status: DISCONTINUED | OUTPATIENT
Start: 2019-11-12 | End: 2019-11-12

## 2019-11-12 RX ORDER — MORPHINE SULFATE 10 MG/ML
6 INJECTION, SOLUTION INTRAMUSCULAR; INTRAVENOUS EVERY 10 MIN PRN
Status: DISCONTINUED | OUTPATIENT
Start: 2019-11-12 | End: 2019-11-12 | Stop reason: HOSPADM

## 2019-11-12 RX ORDER — ONDANSETRON 2 MG/ML
INJECTION INTRAMUSCULAR; INTRAVENOUS AS NEEDED
Status: DISCONTINUED | OUTPATIENT
Start: 2019-11-12 | End: 2019-11-12 | Stop reason: SURG

## 2019-11-12 RX ORDER — HYDROMORPHONE HYDROCHLORIDE 1 MG/ML
0.4 INJECTION, SOLUTION INTRAMUSCULAR; INTRAVENOUS; SUBCUTANEOUS EVERY 2 HOUR PRN
Status: DISCONTINUED | OUTPATIENT
Start: 2019-11-12 | End: 2019-11-12

## 2019-11-12 RX ORDER — IBUPROFEN 600 MG/1
600 TABLET ORAL EVERY 8 HOURS PRN
Status: DISCONTINUED | OUTPATIENT
Start: 2019-11-12 | End: 2019-11-12

## 2019-11-12 RX ORDER — MORPHINE SULFATE 2 MG/ML
2 INJECTION, SOLUTION INTRAMUSCULAR; INTRAVENOUS EVERY 10 MIN PRN
Status: DISCONTINUED | OUTPATIENT
Start: 2019-11-12 | End: 2019-11-12 | Stop reason: HOSPADM

## 2019-11-12 RX ADMIN — ONDANSETRON 4 MG: 2 INJECTION INTRAMUSCULAR; INTRAVENOUS at 13:11:00

## 2019-11-12 RX ADMIN — SODIUM CHLORIDE, SODIUM LACTATE, POTASSIUM CHLORIDE, CALCIUM CHLORIDE: 600; 310; 30; 20 INJECTION, SOLUTION INTRAVENOUS at 14:21:00

## 2019-11-12 RX ADMIN — MIDAZOLAM HYDROCHLORIDE 2 MG: 1 INJECTION INTRAMUSCULAR; INTRAVENOUS at 13:07:00

## 2019-11-12 RX ADMIN — ROCURONIUM BROMIDE 50 MG: 10 INJECTION, SOLUTION INTRAVENOUS at 13:13:00

## 2019-11-12 RX ADMIN — LIDOCAINE HYDROCHLORIDE 80 MG: 20 INJECTION, SOLUTION EPIDURAL; INFILTRATION; INTRACAUDAL; PERINEURAL at 13:11:00

## 2019-11-12 RX ADMIN — DEXAMETHASONE SODIUM PHOSPHATE 4 MG: 4 MG/ML VIAL (ML) INJECTION at 13:11:00

## 2019-11-12 RX ADMIN — GLYCOPYRROLATE 0.2 MG: 0.2 INJECTION INTRAMUSCULAR; INTRAVENOUS at 13:11:00

## 2019-11-12 NOTE — PLAN OF CARE
Patient admitted from ED. A&O x4. Self care in room. IV fluids and antibiotics given. Patient NPO. Patient reported >3 loose stools in the last day, enteric precautions in place until C. Diff ruled out. Pain controlled with PRN morphine.  Zofran given once Implement non-pharmacological measures as appropriate and evaluate response  - Consider cultural and social influences on pain and pain management  - Manage/alleviate anxiety  - Utilize distraction and/or relaxation techniques  - Monitor for opioid side ef

## 2019-11-12 NOTE — OPERATIVE REPORT
Operative Report    Patient Name:  Arvilla Landau  MR:  [id number]  :  1985  DOS:  17    Preop Dx:  Appendicitis [K37]  Postop Dx:  Appendicitis [K37]  Procedure:  Laparoscopic appendectomy  Surgeon:  Garland Ryan MD   Surgical Assistant site bleeding was seen. The umbilical fascia and the left lower quadrant incision were closed using 0 vicryl. The skin incisions were closed using 4-0 vicryl. Marcaine was injected and dermabond applied. Steri strips were applied.   All instrument and sp

## 2019-11-12 NOTE — ANESTHESIA POSTPROCEDURE EVALUATION
Patient: Ileana Perez    Procedure Summary     Date:  11/12/19 Room / Location:  [facility] OR  / [facility] OR    Anesthesia Start:  1159 Anesthesia Stop:  2392    Procedure:  LAPAROSCOPIC APPENDECTOMY (N/A Abdomen) Diagnosis:       Appendicitis      (A

## 2019-11-12 NOTE — ANESTHESIA PROCEDURE NOTES
Airway  Date/Time: 11/12/2019 1:15 PM  Urgency: Elective    Airway not difficult    General Information and Staff    Patient location during procedure: OR  Anesthesiologist: Nuria Dover MD  Resident/CRNA: Lulu Salomon CRNA  Performed: CRNA     I

## 2019-11-12 NOTE — CONSULTS
[facility] HOSP - [facility]    Report of Consultation    Eric Nuno Patient Status:  Inpatient    1985 MRN [id number]   Location [place] [address] Attending Lupe Arnold MD   Hosp Day # 1 PCP Maciel Vu.  Herbie Chavez, DO     Date MAIN OR   • LASER HOLMIUM LITHOTRIPSY N/A 12/5/2017    Performed by Amanda Arias MD at [facility] MAIN OR   • REPR CMPL WND HEAD,FAC,HAND 2.6-7.5  09/16/2019     Family History   Problem Relation Age of Onset   • Cancer Mother 50        Uterine ca   • Ovari no persistent, recurrent  headaches  PSYCHE:no depression or anxiety  HEMATOLOGIC: no hx of blood dyscrasia  ENDOCRINE: no new endocrine problems  ALL/ASTHMA: no new hx of severe allergy or asthma  BACK: normal, no spinal deformity, no CVA tenderness acute appendicitis type        Acute appendicitis           Recommendations:  Plan for lapsc poss open appy when OR available. Continue Zosyn. R/B/alt d/w pt and his family.        Jocelyne Velez MD  11/12/2019

## 2019-11-12 NOTE — PAYOR COMM NOTE
--------------  ADMISSION REVIEW     Payor: Kelley Nissen [employer]  Subscriber #:  [id number]  Authorization Number: [id number]     Admit date: 11/11/19  Admit time: 2344       Admitting Physician: Maverick Ryo MD  Attending Physician:  Scout Sharp Performed by Portia Nails MD at [address] [facility] 11/22/2017    Performed by Portia Nails MD at [facility] OR   • [facility] N/A 12/5/2017    Performed by Portia Nails MD at [address] [place] [address] OR   • All other systems reviewed and negative except as noted above.     Physical Exam     ED Triage Vitals [11/11/19 1950]   BP (!) 156/99   Pulse 84   Resp 20   Temp 97.5 °F (36.4 °C)   Temp src Oral   SpO2 100 %   O2 Device None (Room air)       Current: Hold Blue Auto Resulted    RAINBOW DRAW LAVENDER    Collection Time: 11/11/19  8:05 PM   Result Value Ref Range    Hold Lavender Auto Resulted    RAINBOW DRAW LIGHT GREEN    Collection Time: 11/11/19  8:05 PM   Result Value Ref Range    Hold Lt Green Auto Spec Gravity 1.010 1.002 - 1.035    Glucose Urine Negative Negative mg/dL    Bilirubin Urine Negative Negative    Ketones Urine Negative Negative mg/dL    Blood Urine Negative Negative    pH Urine 7.0 5.0 - 8.0    Protein Urine Negative Negative mg/dL The patient is currently a smoker. I spent greater than 3 minutes counseling the patient on smoking cessation. I explained the risks of smoking including chronic lung disease, lung cancer, heart attacks, and strokes.   I advised the patient to quit smokin Signed by Topher Dawson MD on 11/11/2019 10:09 PM            H&P - H&P Note      H&P signed by Chloe Montez MD at 11/12/2019  8:09 AM     Author:  Chloe Montez MD Service:  Dinah Roland Author Type:  Physician    Filed:  11/12/2019  8:09 AM Date of Service: Performed by Jimmie Davidson MD at [facility] OR   • Priyank BAXTER/MARY 12/5/2017    Performed by Jimmie Davidson MD at [facility] OR   • Priyank Machado 11/22/2017    Performed by Jimmie Davidson MD at [address] [place] [address] Multiple Vitamin (MULTI-VITAMINS) Oral Tab   Yes No   Sig: Take  by mouth. Tobramycin Sulfate (TOBREX) 0.3 % Ophthalmic Solution   No No   Sig: Apply 2 drops to eye every 4 (four) hours.    cephALEXin 500 MG Oral Cap   No No   Sig: Take 1 capsule (500 mg Psychiatric:  Cooperative, appropriate mood & affect.       Laboratory Data:   Lab Results   Component Value Date    WBC 10.9 11/11/2019    HGB 15.2 11/11/2019    HCT 43.7 11/11/2019    .0 11/11/2019    CREATSERUM 0.87 11/11/2019    BUN 13 11/11/2019 Patient Name:  Hurman Claude  MR:  [id number]  :  1985  DOS:  17     Preop Dx:  Appendicitis [K37]  Postop Dx:  Appendicitis [K37]  Procedure:  Laparoscopic appendectomy  Surgeon:  Jessica Sparks MD   Surgical Assistant.: Ashleigh Ham The patient was taken to the OR and placed in supine position. General anesthesia was established and the abdomen was prepped in standard fashion. Pneumoperitoneum was obtained using open technique through a 1 cm umbilical incision.   A 12-mm trocar was i    Date of Admission:  11/11/2019  Date of Consult: 11/12/2019     Reason for Consultation:   Consults     History provided by:patient  HPI:   Patient presents with:  Abdomen/Flank Pain (GI/)     HPI  Called for consult related to early acute appendiciti Problem Relation Age of Onset   • Cancer Mother 50         Uterine ca   • Ovarian Cancer Mother     • Cancer Sister     • Genetic Disease Sister           Neurofibromatosis   • Breast Cancer Sister     • Other (Other) Sister           NERVE SHEATH TUMOR ALL/ASTHMA: no new hx of severe allergy or asthma  BACK: normal, no spinal deformity, no CVA tenderness           Physical Exam:   Vital Signs:   height is 6' 2\" (1.88 m) and weight is 284 lb 4.8 oz (129 kg). His oral temperature is 98.5 °F (36.9 °C).  His Plan for lapsc poss open appy when OR available. Continue Zosyn.  R/B/alt d/w pt and his family.   Selvin Young MD  11/12/2019            MEDICATIONS ADMINISTERED IN LAST 1 DAY:  bupivacaine PF (MARCAINE) 0.25% injection     Date Action Dose Route morphINE sulfate (PF) 4 MG/ML injection 2 mg     Date Action Dose Route User    11/11/2019 2240 Given 2 mg Intravenous Qi Robles RN      morphINE sulfate (PF) 4 MG/ML injection 4 mg     Date Action Dose Route User    11/12/2019 1108 Given 4 mg Intr Date Action Dose Route User    11/12/2019 1313 Given 50 mg Intravenous Lela Adams MD      sodium chloride 0.9% IV bolus 1,000 mL     Date Action Dose Route User    Admitted on 11/11/2019 11/11/2019 2004 New Bag 1000 mL Intravenous [place]

## 2019-11-12 NOTE — ED INITIAL ASSESSMENT (HPI)
The patient reports diarrhea since yesterday with nausea and no vomiting, and RLQ abdominal pain since this morning, denying urinary complaints and denying any blood or black in his stools.

## 2019-11-12 NOTE — ED PROVIDER NOTES
Patient Seen in: [facility] AND [facility] Emergency Department      History   Patient presents with:  Abdomen/Flank Pain (GI/)    Stated Complaint: abd pain     HPI    12-year-old male with history of anxiety, nephrolithiasis, here with complaints of right lo Performed by Flores Castellano MD at [facility] OR   • LASER HOLMIUM LITHOTRIPSY N/A 12/5/2017    Performed by Vicenta Whipple MD at [facility]   • REPR CMPL WND HEAD,FAC,HAND 2.6-7.5  09/16/2019                    Social History    Tobacco Use      Smoking sta Pupils: Pupils are equal, round, and reactive to light. Neck:      Musculoskeletal: Normal range of motion. Cardiovascular:      Rate and Rhythm: Normal rate and regular rhythm. Heart sounds: No murmur.       Comments: 2+ radial and DP pulses b/l, mg/dL    Creatinine 0.87 0.70 - 1.30 mg/dL    BUN/CREA Ratio 14.9 10.0 - 20.0    Calcium, Total 8.6 8.5 - 10.1 mg/dL    Calculated Osmolality 285 275 - 295 mOsm/kg    GFR, Non- 111 >=60    GFR, -American 128 >=60   CBC W/ DIFFERENTIA retrocecal appendix extending medially. Minimal periappendiceal stranding. No abscess or phlegmon. 2. Small nonobstructing 4.2 mm calculus lower pole left kidney. No hydroureteronephrosis.  3. S-shaped scoliosis dorsal lumbar spine multilevel spondylosis summaries, testing, and procedures, and reviewed those reports. Complicating Factors: The patient already has does not have any pertinent problems on file. to contribute to the complexity of his ED evaluation.         [address] [place] [address]

## 2019-11-12 NOTE — BRIEF OP NOTE
Pre-Operative Diagnosis: Appendicitis [K37]     Post-Operative Diagnosis: Appendicitis [K37]      Procedure Performed:   Procedure(s):  laparoscopic appendectomy    Surgeon(s) and Role:     * Kasey Barnard MD - Primary    Assistant(s):  Surgical [facility]

## 2019-11-12 NOTE — H&P
[facility] Patient Status:  Emergency    1985 MRN [id number]   Location [address] Attending Janeen Batista MD   [facility] Day # 0 PCP Jocy Callaway,      Date: URETEROSCOPY N/A 12/5/2017    Performed by Portia Nails MD at [facility] OR   • [place]  2010   • [address] 2.1-3CM [address] [place] [address] Ave  09/16/2019   • EXCISION LESION HEAD/NECK/FACE Right 9/16/2019    Performed by Nitish Lewis Weakness, Fatigue. Eye:  Negative. Ear/Nose/Mouth/Throat:  Negative. Respiratory:  Negative  Cardiovascular: Negative  Gastrointestinal: Nausea, diarrhea and abdominal pain  Genitourinary:  Negative  Endocrine:  Negative. Immunologic:  Negative.   [facility] Pelvis Iv Contrast, No Oral (er)    Result Date: 11/11/2019  CONCLUSION:  1. ACUTE APPENDICITIS with abnormally inflamed retrocecal appendix extending medially. Minimal periappendiceal stranding. No abscess or phlegmon.  2. Small nonobstructing 4.2 mm katelin

## 2019-11-12 NOTE — ED INITIAL ASSESSMENT (HPI)
PATIENT REPORTS RLQ PAIN STARTING THIS AM.  DENIES FEVERS.  + NAUSEA ALSO REPORTS LOOSE STOOL.  + RLQ TENDERNESS.

## 2019-11-12 NOTE — ED PROVIDER NOTES
Patient Seen in: [facility]      History   Patient presents with:  Abdomen/Flank Pain (GI/)    Stated Complaint: abdominal pain     HPI  Patient woke this morning with vague abdominal pain.   Pain worsened as the day lauryn • LAPAROSCOPIC CHOLECYSTECTOMY N/A 11/30/2018    Performed by Otilia Peña MD at [address] [place] [address] OR   • LASER HOLMIUM LITHOTRIPSY N/A 12/5/2017    Performed by Brittney Harris MD at [address] [place] [address] OR   • REPR CMPL WND HEAD,FAC,HAND 2.6-7.5  09/16/2019 quadrant. There is no right CVA tenderness or left CVA tenderness. Musculoskeletal: Normal range of motion. Lymphadenopathy:      Cervical: No cervical adenopathy. Skin:     General: Skin is warm and dry. Coloration: Skin is not pale.    Neurolog

## 2019-11-12 NOTE — ANESTHESIA PREPROCEDURE EVALUATION
Anesthesia PreOp Note    HPI:     Missael Torres is a 37year old male who presents for preoperative consultation requested by: Sam Brown MD    Date of Surgery: 11/11/2019 - 11/12/2019    Procedure(s):  LAPAROSCOPIC APPENDECTOMY  Indication: Nicko MD at [facility] OR   • CYSTOSCOPY STENT INSERTION N/A 12/5/2017    Performed by Lupe Chacon MD at [facility] OR   • [place] Right 11/22/2017    Performed by Lupe Chacon MD at [facility] OR   • CYSTOSCOPY STONE MANIPULATION N/A 1 Intravenous, Q6H PRN, Jazmyn Chin MD, 4 mg at 11/12/19 0848  [MAR Hold] morphINE sulfate (PF) 2 MG/ML injection 2 mg, 2 mg, Intravenous, Q2H PRN, Jazmyn Chin MD    Or  [facility] Hold] morphINE sulfate (PF) 4 MG/ML injection 4 mg, 4 mg, Intravenous, Q2 Not on file      Food insecurity:        Worry: Not on file        Inability: Not on file      Transportation needs:        Medical: Not on file        Non-medical: Not on file    Tobacco Use      Smoking status: Current Every Day Smoker        Packs/day: Great Deal of Time in Sun: No        Bad sunburns in the past: No        Tanning Salons in the Past: No        Hx of Radiation Treatments: No        Regular use of sun block: Not Asked    Social History Narrative      The patient does not use an assistive Consent Plan and Risks Discussed With:  Patient  Discussed plan with:  CRNA      I have informed Katalina Angeles and/or legal guardian or family member of the nature of the anesthetic plan, benefits, risks including possible dental damage if relevant, srini

## 2019-11-12 NOTE — PLAN OF CARE
Problem: Patient Centered Care  Goal: Patient preferences are identified and integrated in the patient's plan of care  Description  Interventions:  - What would you like us to know as we care for you? I would like to go home today.   - Provide timely, com to review patient's medication profile  Outcome: Adequate for Discharge     Problem: PAIN - ADULT  Goal: Verbalizes/displays adequate comfort level or patient's stated pain goal  Description  INTERVENTIONS:  - Encourage pt to monitor pain and request luis antonio

## 2019-11-12 NOTE — DISCHARGE SUMMARY
[facility]    Discharge Summary    Peter Rosas Patient Status:  Inpatient    1985 MRN [id number]   Location [facility] 4W/SW/SE Attending Gagan Clay MD   Hosp Day # 1 PCP Pilar Minor.  DO Sohail     Date of Admis extending medially. Minimal periappendiceal stranding. No abscess or phlegmon. 2. Small nonobstructing 4.2 mm calculus lower pole left kidney. No hydroureteronephrosis. 3. S-shaped scoliosis dorsal lumbar spine multilevel spondylosis.   4. Small hiatal h BY MOUTH THREE TIMES DAILY   Quantity:  270 tablet  Refills:  3     lansoprazole 30 MG Cpdr  Commonly known as:  PREVACID      TAKE ONE CAPSULE BY MOUTH TWICE DAILY   Quantity:  180 capsule  Refills:  3     THERA/BETA-CAROTENE Tabs      Take  by mouth.    R

## 2019-11-13 ENCOUNTER — TELEPHONE (OUTPATIENT)
Dept: INTERNAL MEDICINE UNIT | Facility: HOSPITAL | Age: 34
End: 2019-11-13

## 2019-11-13 RX ORDER — LANSOPRAZOLE 30 MG/1
CAPSULE, DELAYED RELEASE ORAL
Qty: 180 CAPSULE | Refills: 0 | Status: SHIPPED | OUTPATIENT
Start: 2019-11-13 | End: 2020-02-11

## 2019-11-13 NOTE — PAYOR COMM NOTE
--------------  DISCHARGE REVIEW    [facility]  Subscriber #:  [id number]  Authorization Number: [id number]     Admit date: 11/11/19  Admit time:  2344  Discharge Date: 11/12/2019  6:58 PM     Admitting Physician: Ike Middleton MD appendicitis  Uncomplicated, patient started on Zosyn 3.375 g IV piggyback every 8 hours. Surgery  was consulted. Went for lap appe on 11/12/19.   Did well, can go home tonight if fracisco diet and pain controlled.       GERD  Continue PPI.     Tobacco abuse  P Discharge Medications      START taking these medications      Instructions Prescription details   HYDROcodone-acetaminophen 7.5-325 MG Tabs  Commonly known as:  NORCO  Replaces:  HYDROcodone-acetaminophen 5-325 MG Tabs      Take 1-2 tablets by mouth teja Other Discharge Instructions:       ----------------------------------------------------  >30 MIN SPENT ON [address] [facility]  11/12/2019  3:28 PM                        Electronically signed by Ta Jewell MD on 11/12/2019  3:29

## 2019-11-13 NOTE — TELEPHONE ENCOUNTER
Patient called to schedule 2 week follow up w PCP. Pt declined scheduling at this time. States he will schedule via My chart.

## 2020-01-22 ENCOUNTER — OFFICE VISIT (OUTPATIENT)
Dept: FAMILY MEDICINE CLINIC | Facility: CLINIC | Age: 35
End: 2020-01-22
Payer: COMMERCIAL

## 2020-01-22 VITALS
DIASTOLIC BLOOD PRESSURE: 88 MMHG | TEMPERATURE: 98 F | HEIGHT: 74 IN | WEIGHT: 297 LBS | SYSTOLIC BLOOD PRESSURE: 130 MMHG | HEART RATE: 98 BPM | BODY MASS INDEX: 38.12 KG/M2

## 2020-01-22 DIAGNOSIS — B37.0 THRUSH: ICD-10-CM

## 2020-01-22 DIAGNOSIS — L25.9 CONTACT DERMATITIS, UNSPECIFIED CONTACT DERMATITIS TYPE, UNSPECIFIED TRIGGER: ICD-10-CM

## 2020-01-22 DIAGNOSIS — J06.9 VIRAL UPPER RESPIRATORY TRACT INFECTION: Primary | ICD-10-CM

## 2020-01-22 DIAGNOSIS — L01.00 IMPETIGO: ICD-10-CM

## 2020-01-22 PROCEDURE — 99214 OFFICE O/P EST MOD 30 MIN: CPT | Performed by: FAMILY MEDICINE

## 2020-01-22 RX ORDER — CEPHALEXIN 500 MG/1
500 TABLET ORAL 4 TIMES DAILY
Qty: 40 TABLET | Refills: 0 | Status: SHIPPED | OUTPATIENT
Start: 2020-01-22 | End: 2021-01-12

## 2020-01-22 RX ORDER — MOMETASONE FUROATE 1 MG/G
1 CREAM TOPICAL 2 TIMES DAILY PRN
Qty: 50 G | Refills: 0 | Status: SHIPPED | OUTPATIENT
Start: 2020-01-22 | End: 2021-01-12

## 2020-01-22 RX ORDER — CLOTRIMAZOLE 10 MG/1
10 LOZENGE ORAL; TOPICAL
Qty: 50 TROCHE | Refills: 0 | Status: SHIPPED | OUTPATIENT
Start: 2020-01-22 | End: 2021-01-12

## 2020-01-22 NOTE — PROGRESS NOTES
Blood pressure 130/88, pulse 98, temperature 98.4 °F (36.9 °C), temperature source Oral, height 6' 2\" (1.88 m), weight 297 lb (134.7 kg). 3-day history of cough clear to yellow phlegm. Still smoking some chills. Headaches at the temples.   Denies faci

## 2020-02-11 RX ORDER — LANSOPRAZOLE 30 MG/1
CAPSULE, DELAYED RELEASE ORAL
Qty: 180 CAPSULE | Refills: 1 | Status: SHIPPED | OUTPATIENT
Start: 2020-02-11 | End: 2020-08-10

## 2020-02-12 NOTE — TELEPHONE ENCOUNTER
Refill passed per [facility] [place], [facility] protocol.   Refill Protocol Appointment Criteria  · Appointment scheduled in the past 12 months or in the next 3 months  Recent Outpatient Visits            2 weeks ago Viral upper respiratory tract infection    Manuela Sainz

## 2020-03-26 ENCOUNTER — TELEPHONE (OUTPATIENT)
Dept: FAMILY MEDICINE CLINIC | Facility: CLINIC | Age: 35
End: 2020-03-26

## 2020-03-26 DIAGNOSIS — R19.7 DIARRHEA, UNSPECIFIED TYPE: Primary | ICD-10-CM

## 2020-06-12 RX ORDER — BUPROPION HYDROCHLORIDE 150 MG/1
TABLET, EXTENDED RELEASE ORAL
Qty: 270 TABLET | Refills: 3 | Status: SHIPPED | OUTPATIENT
Start: 2020-06-12 | End: 2021-01-12

## 2020-06-15 ENCOUNTER — TELEPHONE (OUTPATIENT)
Dept: FAMILY MEDICINE CLINIC | Facility: CLINIC | Age: 35
End: 2020-06-15

## 2020-06-15 NOTE — TELEPHONE ENCOUNTER
Fax received at [address] [place] [address] with following message. Plan does not cover this medication. Please call plan at [phone] to initiate PA or call/fax pharmacy to change medication. Patient CA#[id number].     Current Outpatient Medications   Medication Sig Disp

## 2020-06-15 NOTE — TELEPHONE ENCOUNTER
Prior authorization for Bupropion completed w/ [facility] [employer] on cover my meds Key: [id number]    Outcome   Additional Information Required   Drug is covered by current benefit plan.  No further PA activity needed   DrugbuPROPion HCl ER (SR) 150MG er table

## 2020-08-10 RX ORDER — LANSOPRAZOLE 30 MG/1
30 CAPSULE, DELAYED RELEASE ORAL 2 TIMES DAILY
Qty: 180 CAPSULE | Refills: 1 | Status: SHIPPED | OUTPATIENT
Start: 2020-08-10 | End: 2021-02-05

## 2020-08-10 NOTE — TELEPHONE ENCOUNTER
Fax received requesting refill.      Current Outpatient Medications   Medication Sig Dispense Refill   • LANSOPRAZOLE 30 MG Oral Capsule Delayed Release TAKE ONE CAPSULE BY MOUTH TWICE DAILY 180 capsule 1

## 2021-01-12 ENCOUNTER — TELEPHONE (OUTPATIENT)
Dept: SURGERY | Facility: CLINIC | Age: 36
End: 2021-01-12

## 2021-01-12 ENCOUNTER — OFFICE VISIT (OUTPATIENT)
Dept: SURGERY | Facility: CLINIC | Age: 36
End: 2021-01-12
Payer: COMMERCIAL

## 2021-01-12 VITALS
HEIGHT: 74 IN | DIASTOLIC BLOOD PRESSURE: 92 MMHG | BODY MASS INDEX: 33.37 KG/M2 | SYSTOLIC BLOOD PRESSURE: 144 MMHG | WEIGHT: 260 LBS | HEART RATE: 93 BPM

## 2021-01-12 DIAGNOSIS — R82.993 HYPERURICOSURIA: ICD-10-CM

## 2021-01-12 DIAGNOSIS — R31.0 GROSS HEMATURIA: ICD-10-CM

## 2021-01-12 DIAGNOSIS — R82.994 HYPERCALCIURIA: ICD-10-CM

## 2021-01-12 DIAGNOSIS — R10.9 BILATERAL FLANK PAIN: ICD-10-CM

## 2021-01-12 DIAGNOSIS — E87.0 HYPERNATRIURIA: ICD-10-CM

## 2021-01-12 DIAGNOSIS — Z87.891 FORMER SMOKER: ICD-10-CM

## 2021-01-12 DIAGNOSIS — N20.0 KIDNEY STONES: Primary | ICD-10-CM

## 2021-01-12 DIAGNOSIS — R10.31 RIGHT LOWER QUADRANT PAIN: ICD-10-CM

## 2021-01-12 LAB
BILIRUB UR QL: NEGATIVE
CLARITY UR: CLEAR
COLOR UR: YELLOW
GLUCOSE UR-MCNC: NEGATIVE MG/DL
HGB UR QL STRIP.AUTO: NEGATIVE
KETONES UR-MCNC: NEGATIVE MG/DL
LEUKOCYTE ESTERASE UR QL STRIP.AUTO: NEGATIVE
NITRITE UR QL STRIP.AUTO: NEGATIVE
PH UR: 6 [PH] (ref 5–8)
PROT UR-MCNC: NEGATIVE MG/DL
SP GR UR STRIP: 1.01 (ref 1–1.03)
UROBILINOGEN UR STRIP-ACNC: <2

## 2021-01-12 PROCEDURE — 3077F SYST BP >= 140 MM HG: CPT | Performed by: UROLOGY

## 2021-01-12 PROCEDURE — 3008F BODY MASS INDEX DOCD: CPT | Performed by: UROLOGY

## 2021-01-12 PROCEDURE — 3080F DIAST BP >= 90 MM HG: CPT | Performed by: UROLOGY

## 2021-01-12 PROCEDURE — 99214 OFFICE O/P EST MOD 30 MIN: CPT | Performed by: UROLOGY

## 2021-01-12 RX ORDER — ACETAMINOPHEN AND CODEINE PHOSPHATE 300; 30 MG/1; MG/1
1 TABLET ORAL EVERY 6 HOURS PRN
Qty: 10 TABLET | Refills: 0 | OUTPATIENT
Start: 2021-01-12 | End: 2021-08-25 | Stop reason: ALTCHOICE

## 2021-01-12 NOTE — TELEPHONE ENCOUNTER
Pt was seen today, asking if pain medication can be prescribed for his condition? Please advise thank you.

## 2021-01-12 NOTE — PROGRESS NOTES
HPI:    Patient ID: Estela Ledesma is a 38year old male. HPI     Bilateral Flank Pain   Problem started 12/28/2020.  Patient complains of intermittent, explosive and sharp, mid to low back pain that radiates to the groin; associated nausea; denies fev difficulty postponing urination, gross hematuria and urinary hesitancy as well as associated dysuria. Patient is not currently taking any  medication.  The patient feels that the voiding dysfunction is slightly worse compared to last time due to possible ureteral stent   12/05/2017 cystoscopy, replacement of right ureteral stent, right ureteroscopy and right nephroscopy and laser lithotripsy of stone in ureter and kidney and basket extraction of stone endoscopically; calculus in right ureter; right uretera [address] [place] [address] OR   • CYSTOSCOPY STENT INSERTION N/A 12/5/2017    Performed by Patricia Aponte MD at [address] [place] [address] OR   • [place] Right 11/22/2017    Performed by Patricia Aponte MD at [address] [place] [address] OR   • [address] [place] [address] N/A 12/5/20 often have you found that you stopped and started again several times when you urinated?: Not at all  Over the past month, how often have you found it difficult to postpone urination?: Not at all  Over the past month, how often have you had a weak urinary moderate; WBC = <1; RBC = 1; Bacteria = negative   11/17/2018 UA blood = negative; protein = negative; Leukocyte = negative; Microscopic not indicated   24/82/4941 Metabolic 24 hour urine collection = Total Volume - 6,650 mL; Calcium - 645 (hypercalcuria 5 smoker     (N20.0) Kidney stones   (primary encounter diagnosis)  Chronic. 11/22/2017 cystoscopy with right retrograde pyelogram, insertion of right ureteral stent.  12/05/2017 cystoscopy, replacement of right ureteral stent, right ureteroscopy and right ne armida. He states this lasted for 2 weeks. The patient states this is better when he is laying down and when he takes Aleve. He states this is worse when he is standing or sitting up.  Patient states that he feels that he may be passing a kidney stone, lencho Metabolic 24 Hr Urine Collection Study not complete. I recommend and patient agrees to continue with diet management.      (E83.50) Hypercalciuria  49/31/3767 Metabolic 24 hour urine collection - Calcium = 645 ().  Recent Metabolic 24 Hr Urine Collect below, I, Kavita Alatorre,  attest that this documentation has been prepared under the direction and in the presence of Colan Homans, MD.   Electronically Signed: Kavita Alatorre, 1/12/2021, 12:59 PM.     Cece Martinez MD,  personally performed the servic

## 2021-01-12 NOTE — TELEPHONE ENCOUNTER
Called patient, identified by name and   Patient requesting pain medication for flank pain  8/10, was seen today by PVK     Scheduled his imaging orders, soonest available is 01/15/2021.  Spoke with PVK, will order pain medication to help alleviate until

## 2021-01-12 NOTE — PATIENT INSTRUCTIONS
Shemar Giordano M.D.    1.   Plan today for complete urinalysis and urine culture and urine for cytology--we will notify you of the results    2. CT stone protocol. Please schedule by calling [phone]    3.   KUB

## 2021-01-13 ENCOUNTER — HOSPITAL ENCOUNTER (EMERGENCY)
Facility: HOSPITAL | Age: 36
Discharge: HOME | End: 2021-01-13
Attending: EMERGENCY MEDICINE
Payer: COMMERCIAL

## 2021-01-13 ENCOUNTER — APPOINTMENT (OUTPATIENT)
Dept: CT IMAGING | Facility: HOSPITAL | Age: 36
End: 2021-01-13
Attending: EMERGENCY MEDICINE
Payer: COMMERCIAL

## 2021-01-13 VITALS
HEIGHT: 74 IN | WEIGHT: 260 LBS | HEART RATE: 76 BPM | RESPIRATION RATE: 18 BRPM | BODY MASS INDEX: 33.37 KG/M2 | TEMPERATURE: 98 F | OXYGEN SATURATION: 98 % | SYSTOLIC BLOOD PRESSURE: 137 MMHG | DIASTOLIC BLOOD PRESSURE: 89 MMHG

## 2021-01-13 DIAGNOSIS — M54.50 BACK PAIN, LUMBOSACRAL: Primary | ICD-10-CM

## 2021-01-13 LAB
ANION GAP SERPL CALC-SCNC: 6 MMOL/L (ref 0–18)
BASOPHILS # BLD AUTO: 0.09 X10(3) UL (ref 0–0.2)
BASOPHILS NFR BLD AUTO: 0.7 %
BILIRUB UR QL: NEGATIVE
BUN BLD-MCNC: 12 MG/DL (ref 7–18)
BUN/CREAT SERPL: 14.5 (ref 10–20)
CALCIUM BLD-MCNC: 9.1 MG/DL (ref 8.5–10.1)
CHLORIDE SERPL-SCNC: 108 MMOL/L (ref 98–112)
CLARITY UR: CLEAR
CO2 SERPL-SCNC: 24 MMOL/L (ref 21–32)
COLOR UR: YELLOW
CREAT BLD-MCNC: 0.83 MG/DL
DEPRECATED RDW RBC AUTO: 41.2 FL (ref 35.1–46.3)
EOSINOPHIL # BLD AUTO: 0.16 X10(3) UL (ref 0–0.7)
EOSINOPHIL NFR BLD AUTO: 1.3 %
ERYTHROCYTE [DISTWIDTH] IN BLOOD BY AUTOMATED COUNT: 12.5 % (ref 11–15)
GLUCOSE BLD-MCNC: 88 MG/DL (ref 70–99)
GLUCOSE UR-MCNC: NEGATIVE MG/DL
HCT VFR BLD AUTO: 45.8 %
HGB BLD-MCNC: 15.7 G/DL
HGB UR QL STRIP.AUTO: NEGATIVE
IMM GRANULOCYTES # BLD AUTO: 0.03 X10(3) UL (ref 0–1)
IMM GRANULOCYTES NFR BLD: 0.2 %
KETONES UR-MCNC: NEGATIVE MG/DL
LEUKOCYTE ESTERASE UR QL STRIP.AUTO: NEGATIVE
LYMPHOCYTES # BLD AUTO: 2.02 X10(3) UL (ref 1–4)
LYMPHOCYTES NFR BLD AUTO: 16.4 %
MCH RBC QN AUTO: 31 PG (ref 26–34)
MCHC RBC AUTO-ENTMCNC: 34.3 G/DL (ref 31–37)
MCV RBC AUTO: 90.5 FL
MONOCYTES # BLD AUTO: 0.84 X10(3) UL (ref 0.1–1)
MONOCYTES NFR BLD AUTO: 6.8 %
NEUTROPHILS # BLD AUTO: 9.17 X10 (3) UL (ref 1.5–7.7)
NEUTROPHILS # BLD AUTO: 9.17 X10(3) UL (ref 1.5–7.7)
NEUTROPHILS NFR BLD AUTO: 74.6 %
NITRITE UR QL STRIP.AUTO: NEGATIVE
OSMOLALITY SERPL CALC.SUM OF ELEC: 285 MOSM/KG (ref 275–295)
PH UR: 7 [PH] (ref 5–8)
PLATELET # BLD AUTO: 266 10(3)UL (ref 150–450)
POTASSIUM SERPL-SCNC: 3.7 MMOL/L (ref 3.5–5.1)
PROT UR-MCNC: NEGATIVE MG/DL
RBC # BLD AUTO: 5.06 X10(6)UL
SODIUM SERPL-SCNC: 138 MMOL/L (ref 136–145)
SP GR UR STRIP: 1.01 (ref 1–1.03)
UROBILINOGEN UR STRIP-ACNC: <2
WBC # BLD AUTO: 12.3 X10(3) UL (ref 4–11)

## 2021-01-13 PROCEDURE — 96374 THER/PROPH/DIAG INJ IV PUSH: CPT

## 2021-01-13 PROCEDURE — 99284 EMERGENCY DEPT VISIT MOD MDM: CPT

## 2021-01-13 PROCEDURE — 85025 COMPLETE CBC W/AUTO DIFF WBC: CPT | Performed by: EMERGENCY MEDICINE

## 2021-01-13 PROCEDURE — 74176 CT ABD & PELVIS W/O CONTRAST: CPT | Performed by: EMERGENCY MEDICINE

## 2021-01-13 PROCEDURE — 81003 URINALYSIS AUTO W/O SCOPE: CPT | Performed by: EMERGENCY MEDICINE

## 2021-01-13 PROCEDURE — 96375 TX/PRO/DX INJ NEW DRUG ADDON: CPT

## 2021-01-13 PROCEDURE — 80048 BASIC METABOLIC PNL TOTAL CA: CPT | Performed by: EMERGENCY MEDICINE

## 2021-01-13 RX ORDER — TRAMADOL HYDROCHLORIDE 50 MG/1
TABLET ORAL EVERY 6 HOURS PRN
Qty: 10 TABLET | Refills: 0 | Status: SHIPPED | OUTPATIENT
Start: 2021-01-13 | End: 2021-01-20

## 2021-01-13 RX ORDER — KETOROLAC TROMETHAMINE 15 MG/ML
15 INJECTION, SOLUTION INTRAMUSCULAR; INTRAVENOUS ONCE
Status: COMPLETED | OUTPATIENT
Start: 2021-01-13 | End: 2021-01-13

## 2021-01-13 RX ORDER — ONDANSETRON 2 MG/ML
4 INJECTION INTRAMUSCULAR; INTRAVENOUS ONCE
Status: COMPLETED | OUTPATIENT
Start: 2021-01-13 | End: 2021-01-13

## 2021-01-13 RX ORDER — ONDANSETRON 2 MG/ML
INJECTION INTRAMUSCULAR; INTRAVENOUS
Status: COMPLETED
Start: 2021-01-13 | End: 2021-01-13

## 2021-01-13 RX ORDER — CIPROFLOXACIN 500 MG/1
500 TABLET, FILM COATED ORAL 2 TIMES DAILY
Qty: 20 TABLET | Refills: 0 | Status: SHIPPED | OUTPATIENT
Start: 2021-01-13 | End: 2021-01-23

## 2021-01-13 NOTE — ED PROVIDER NOTES
Patient Seen in: [facility] AND [facility] Emergency Department      History   Patient presents with:  Abdomen/Flank Pain    Stated Complaint: kidney stone    HPI/Subjective:   HPI    Patient is a 47-year-old male with a history of kidney stones who states he ha Performed by Maddison Alvarez MD at [facility] OR   • LAPAROSCOPIC CHOLECYSTECTOMY  11/30/2018   • LAPAROSCOPIC CHOLECYSTECTOMY N/A 11/30/2018    Performed by Holland Alaniz MD at [facility] OR   • LASER HOLMIUM LITHOTRIPSY N/A 12/5/2017    Performed by Kaylynn Reyes Exhibits no distension and no mass. There is no tenderness. There is no rebound and no guarding. Musculoskeletal: Normal range of motion. Exhibits no edema or tenderness. Lymphadenopathy: No cervical adenopathy.    Neurological: Alert and oriented to pe cholecystectomy. No biliary ductal dilatation. Dictated by (CST): Denis Jhaveri MD on 1/13/2021 at 8:49 AM     Finalized by (CST): Denis Jhaveri MD on 1/13/2021 at 8:53 AM                   MDM      Findings discussed with patient.   Agrees with plan to tr

## 2021-01-13 NOTE — ED INITIAL ASSESSMENT (HPI)
Bilateral flank pain x2 weeks, worse over the last week. History of kidney stones and states this feels similar. +hematuria. +nausea.

## 2021-01-13 NOTE — TELEPHONE ENCOUNTER
Pt states was seen in ER today was told enlarged prostate and kidney stone states was told by MD while in ER that MD needs to see him in 2 days no appts available please advise

## 2021-01-13 NOTE — TELEPHONE ENCOUNTER
Please advise. PT went to the ER today. PT seen in office 1/12/21. Copy of LOV notes below. Treatment Plan & Patient Instructions     1.    Plan today for complete urinalysis and urine culture and urine for cytology--we will notify you of the results

## 2021-01-14 ENCOUNTER — OFFICE VISIT (OUTPATIENT)
Dept: SURGERY | Facility: CLINIC | Age: 36
End: 2021-01-14
Payer: COMMERCIAL

## 2021-01-14 VITALS
HEART RATE: 86 BPM | WEIGHT: 260 LBS | BODY MASS INDEX: 33 KG/M2 | SYSTOLIC BLOOD PRESSURE: 131 MMHG | DIASTOLIC BLOOD PRESSURE: 86 MMHG

## 2021-01-14 DIAGNOSIS — R11.0 NAUSEA: ICD-10-CM

## 2021-01-14 DIAGNOSIS — R10.9 BILATERAL FLANK PAIN: Primary | ICD-10-CM

## 2021-01-14 DIAGNOSIS — R31.0 GROSS HEMATURIA: ICD-10-CM

## 2021-01-14 PROCEDURE — 3079F DIAST BP 80-89 MM HG: CPT | Performed by: NURSE PRACTITIONER

## 2021-01-14 PROCEDURE — 99213 OFFICE O/P EST LOW 20 MIN: CPT | Performed by: NURSE PRACTITIONER

## 2021-01-14 PROCEDURE — 3075F SYST BP GE 130 - 139MM HG: CPT | Performed by: NURSE PRACTITIONER

## 2021-01-14 NOTE — TELEPHONE ENCOUNTER
Spoke with patient assisted in scheduling visit wit Brenda Goldmann, APN. PT states PT confirmed and verbalized understanding.      Future Appointments   Date Time Provider Whitney Aparicio   1/14/2021  2:00 PM Chast[facility]co [facility]

## 2021-01-14 NOTE — TELEPHONE ENCOUNTER
Please call patient back---  1/13/2021 [facility] ER CT today shows mild right hydronephrosis but no stone on the right side so he must of passed the stone;    There is a kidney stone on the other left side but that kidney stone is not causing any blockage and wou

## 2021-01-14 NOTE — PROGRESS NOTES
HPI:    Patient ID: Geo Burgos is a 38year old male. HPI     Bilateral Flank Pain   Problem started 12/28/2020.  Patient complains of intermittent, explosive and sharp, mid to low back pain that radiates to the groin; associated nausea; denies fev prostatitis.       Voiding Dysfunction  Chronic. Patient has current AUA score of 4, mild voiding dysfunction category. Patient complains of urinary frequency less than 2 hours, weak stream and nocturia 1x.  The patient denies sensation of not emptying blad Allergies:No Known Allergies    HISTORY:  Past Medical History:   Diagnosis Date   • Anxiety state    • Calculus of kidney    • Cellulitis due to MRSA     left knee   • Esophageal reflux    • Migraines    • Nephrolithiasis     cystoscopy   • Tonsilli Social History: Social History    Tobacco Use      Smoking status: Current Every Day Smoker        Packs/day: 0.50        Years: 12.00        Pack years: 6        Types: Cigarettes      Smokeless tobacco: Current User    Alcohol use: Yes      Frequency: I recommended CT urogram for further evaluation. Further recommendations once those results are received. He was agreeable. Gross hematuria  Patient denies any further episodes since last office visit with Dr. Saskia Joe on 1/12/20. See above.   UA don

## 2021-01-20 ENCOUNTER — HOSPITAL ENCOUNTER (OUTPATIENT)
Dept: CT IMAGING | Age: 36
Discharge: HOME | End: 2021-01-20
Attending: NURSE PRACTITIONER
Payer: COMMERCIAL

## 2021-01-20 DIAGNOSIS — R10.9 BILATERAL FLANK PAIN: ICD-10-CM

## 2021-01-20 DIAGNOSIS — R11.0 NAUSEA: ICD-10-CM

## 2021-01-20 PROCEDURE — 76377 3D RENDER W/INTRP POSTPROCES: CPT | Performed by: NURSE PRACTITIONER

## 2021-01-20 PROCEDURE — 76376 3D RENDER W/INTRP POSTPROCES: CPT | Performed by: NURSE PRACTITIONER

## 2021-01-20 PROCEDURE — 74178 CT ABD&PLV WO CNTR FLWD CNTR: CPT | Performed by: NURSE PRACTITIONER

## 2021-01-25 ENCOUNTER — PATIENT MESSAGE (OUTPATIENT)
Dept: SURGERY | Facility: CLINIC | Age: 36
End: 2021-01-25

## 2021-01-25 DIAGNOSIS — N40.2 PROSTATE NODULE: ICD-10-CM

## 2021-01-25 DIAGNOSIS — N40.0 ENLARGED PROSTATE: Primary | ICD-10-CM

## 2021-01-25 NOTE — TELEPHONE ENCOUNTER
Called and spoke with patient. He continues to have severe bilateral flank/back pain, worse with movement. His CT does not show any evidence of hydronephrosis or renal obstruction.    I do not see any urological abnormality at this time that could be cont

## 2021-01-26 ENCOUNTER — LAB ENCOUNTER (OUTPATIENT)
Dept: LAB | Age: 36
End: 2021-01-26
Attending: NURSE PRACTITIONER
Payer: COMMERCIAL

## 2021-01-26 DIAGNOSIS — N40.0 ENLARGED PROSTATE: ICD-10-CM

## 2021-01-26 DIAGNOSIS — N40.2 PROSTATE NODULE: ICD-10-CM

## 2021-01-26 LAB — COMPLEXED PSA SERPL-MCNC: 1.75 NG/ML (ref ?–4)

## 2021-01-26 PROCEDURE — 36415 COLL VENOUS BLD VENIPUNCTURE: CPT

## 2021-01-29 ENCOUNTER — TELEPHONE (OUTPATIENT)
Dept: SURGERY | Facility: CLINIC | Age: 36
End: 2021-01-29

## 2021-01-29 DIAGNOSIS — N40.1 BENIGN PROSTATIC HYPERPLASIA WITH LOWER URINARY TRACT SYMPTOMS, SYMPTOM DETAILS UNSPECIFIED: Primary | ICD-10-CM

## 2021-01-29 NOTE — TELEPHONE ENCOUNTER
Spoke with patient. CT shows a 53 mm transverse diameter prostate with a 15 mm hypodense focus, possible BPH nodule? PSA 1.75 ng/ml.   Given enlarged prostate with possible nodule in a 38year old we discussed rechecking PSA in 6 months vs considering a M

## 2021-02-02 ENCOUNTER — TELEPHONE (OUTPATIENT)
Dept: SURGERY | Facility: CLINIC | Age: 36
End: 2021-02-02

## 2021-02-02 NOTE — TELEPHONE ENCOUNTER
[employer] requesting MRI prostate order, [facility] pt contacted them for auth please fax to [phone]. Thank you.

## 2021-02-04 ENCOUNTER — TELEPHONE (OUTPATIENT)
Dept: SURGERY | Facility: CLINIC | Age: 36
End: 2021-02-04

## 2021-02-04 DIAGNOSIS — R10.31 BILATERAL GROIN PAIN: Primary | ICD-10-CM

## 2021-02-04 DIAGNOSIS — R10.32 BILATERAL GROIN PAIN: Primary | ICD-10-CM

## 2021-02-04 NOTE — TELEPHONE ENCOUNTER
Pt identity verified with name & . He reports for the past 2 days bilat groin pain (#4/10) that increases when voiding (#8-9/10) & accompanied by nausea. This am \"I almost passed out;\" Temp 98.7 taken @ work.   His stools have been diarrhea \"since a

## 2021-02-04 NOTE — TELEPHONE ENCOUNTER
Given this pain has been ongoing and it is bilateral I think it is unlikely to be stones. I have done an extensive work up with no organic cause of his pain.   I will place orders for UA and urine culture to check for again for any signs of infection or ab

## 2021-02-04 NOTE — TELEPHONE ENCOUNTER
Mychart message converted to Acute TE      ----- Message from Yumiko Elena sent at 2/4/2021 10:11 AM CST -----  Regarding: Non-Urgent Medical Question  Contact: [phone]  As of the past couple of days I have been noticing that it is becoming mor

## 2021-02-04 NOTE — TELEPHONE ENCOUNTER
S/w pt & read message from [facility] as stated below. He verified locations where he could have his labwork completed. He had no add'l questions. Encounter complete.

## 2021-02-05 RX ORDER — LANSOPRAZOLE 30 MG/1
30 CAPSULE, DELAYED RELEASE ORAL 2 TIMES DAILY
Qty: 180 CAPSULE | Refills: 1 | Status: SHIPPED | OUTPATIENT
Start: 2021-02-05 | End: 2021-08-04

## 2021-02-05 NOTE — TELEPHONE ENCOUNTER
Per pharmacy pt is requesting refill for the following medication. •  lansoprazole 30 MG Oral Capsule Delayed Release, Take 1 capsule (30 mg total) by mouth 2 (two) times daily. , Disp: 180 capsule, Rfl: 1

## 2021-02-12 ENCOUNTER — LAB ENCOUNTER (OUTPATIENT)
Dept: LAB | Age: 36
End: 2021-02-12
Attending: FAMILY MEDICINE
Payer: COMMERCIAL

## 2021-02-12 ENCOUNTER — TELEPHONE (OUTPATIENT)
Dept: SURGERY | Facility: CLINIC | Age: 36
End: 2021-02-12

## 2021-02-12 ENCOUNTER — OFFICE VISIT (OUTPATIENT)
Dept: FAMILY MEDICINE CLINIC | Facility: CLINIC | Age: 36
End: 2021-02-12
Payer: COMMERCIAL

## 2021-02-12 ENCOUNTER — TELEPHONE (OUTPATIENT)
Dept: FAMILY MEDICINE CLINIC | Facility: CLINIC | Age: 36
End: 2021-02-12

## 2021-02-12 VITALS
DIASTOLIC BLOOD PRESSURE: 84 MMHG | HEART RATE: 85 BPM | SYSTOLIC BLOOD PRESSURE: 127 MMHG | HEIGHT: 74 IN | BODY MASS INDEX: 33.37 KG/M2 | WEIGHT: 260 LBS

## 2021-02-12 DIAGNOSIS — R10.32 BILATERAL GROIN PAIN: ICD-10-CM

## 2021-02-12 DIAGNOSIS — N40.1 BENIGN PROSTATIC HYPERPLASIA WITH LOWER URINARY TRACT SYMPTOMS, SYMPTOM DETAILS UNSPECIFIED: Primary | ICD-10-CM

## 2021-02-12 DIAGNOSIS — R10.31 BILATERAL GROIN PAIN: ICD-10-CM

## 2021-02-12 DIAGNOSIS — N41.0 PROSTATITIS, ACUTE: ICD-10-CM

## 2021-02-12 DIAGNOSIS — N41.0 PROSTATITIS, ACUTE: Primary | ICD-10-CM

## 2021-02-12 LAB
BACTERIA UR QL AUTO: NEGATIVE /HPF
BILIRUB UR QL: NEGATIVE
COLOR UR: YELLOW
GLUCOSE UR-MCNC: NEGATIVE MG/DL
HGB UR QL STRIP.AUTO: NEGATIVE
KETONES UR-MCNC: NEGATIVE MG/DL
LEUKOCYTE ESTERASE UR QL STRIP.AUTO: NEGATIVE
NITRITE UR QL STRIP.AUTO: NEGATIVE
PH UR: 6 [PH] (ref 5–8)
PROT UR-MCNC: 30 MG/DL
RBC #/AREA URNS AUTO: 0 /HPF
SP GR UR STRIP: 1.03 (ref 1–1.03)
UROBILINOGEN UR STRIP-ACNC: 2
WBC #/AREA URNS AUTO: 0 /HPF

## 2021-02-12 PROCEDURE — 3079F DIAST BP 80-89 MM HG: CPT | Performed by: FAMILY MEDICINE

## 2021-02-12 PROCEDURE — 87086 URINE CULTURE/COLONY COUNT: CPT

## 2021-02-12 PROCEDURE — 3074F SYST BP LT 130 MM HG: CPT | Performed by: FAMILY MEDICINE

## 2021-02-12 PROCEDURE — 99213 OFFICE O/P EST LOW 20 MIN: CPT | Performed by: FAMILY MEDICINE

## 2021-02-12 PROCEDURE — 3008F BODY MASS INDEX DOCD: CPT | Performed by: FAMILY MEDICINE

## 2021-02-12 PROCEDURE — 81001 URINALYSIS AUTO W/SCOPE: CPT

## 2021-02-12 RX ORDER — HYDROCODONE BITARTRATE AND ACETAMINOPHEN 10; 325 MG/1; MG/1
1 TABLET ORAL EVERY 6 HOURS PRN
Qty: 40 TABLET | Refills: 0 | Status: SHIPPED | OUTPATIENT
Start: 2021-02-12 | End: 2021-08-25 | Stop reason: ALTCHOICE

## 2021-02-12 RX ORDER — LEVOFLOXACIN 500 MG/1
500 TABLET, FILM COATED ORAL DAILY
Qty: 30 TABLET | Refills: 0 | Status: SHIPPED | OUTPATIENT
Start: 2021-02-12 | End: 2021-08-25 | Stop reason: ALTCHOICE

## 2021-02-12 NOTE — TELEPHONE ENCOUNTER
levofloxacin 500 MG Oral Tab      Michelle Armstrong states she need information on above medication the quantity

## 2021-02-12 NOTE — TELEPHONE ENCOUNTER
Per Wilmer Salvador, is requesting to speak to [address] in regards to MRI PROSTATE.  Please advise

## 2021-02-12 NOTE — PROGRESS NOTES
Blood pressure 127/84, pulse 85, height 6' 2\" (1.88 m), weight 260 lb (117.9 kg). Presents today complaining of perineal pain. He has had this for several weeks. He was given antibiotics for 1 week.   He reports that he has a significant amount of jalyn

## 2021-02-12 NOTE — TELEPHONE ENCOUNTER
The  from Ortho came to me with a copy of an MRI prostate order that our pt dropped with her at the [facility] desk stating that [facility] needed to add some info to the order as per the tech at [facility]. The pt already had the prostate MRI on 2/10 but I will place this order on [facility] desk with a copy of this msg and I will also task the msg to [facility].

## 2021-02-12 NOTE — TELEPHONE ENCOUNTER
Called patient and discussed MRI results. No suspicious findings. He has mild BPH with some areas of prostatic inflammation. PI RADS 2. He is still having ongoing pain.   I recommended he consult with his pcp vs physiatry for other etiologies of his jalyn

## 2021-02-17 NOTE — TELEPHONE ENCOUNTER
I placed new order per request.  Marylee Hatcher if this needs to be sent to [facility].

## 2021-04-29 ENCOUNTER — OFFICE VISIT (OUTPATIENT)
Dept: OTOLARYNGOLOGY | Facility: CLINIC | Age: 36
End: 2021-04-29
Payer: COMMERCIAL

## 2021-04-29 ENCOUNTER — OFFICE VISIT (OUTPATIENT)
Dept: FAMILY MEDICINE CLINIC | Facility: CLINIC | Age: 36
End: 2021-04-29
Payer: COMMERCIAL

## 2021-04-29 VITALS
DIASTOLIC BLOOD PRESSURE: 91 MMHG | BODY MASS INDEX: 34.78 KG/M2 | HEIGHT: 74 IN | HEART RATE: 82 BPM | WEIGHT: 271 LBS | SYSTOLIC BLOOD PRESSURE: 134 MMHG

## 2021-04-29 VITALS
TEMPERATURE: 98 F | BODY MASS INDEX: 34.78 KG/M2 | WEIGHT: 271 LBS | HEIGHT: 74 IN | DIASTOLIC BLOOD PRESSURE: 84 MMHG | SYSTOLIC BLOOD PRESSURE: 127 MMHG

## 2021-04-29 DIAGNOSIS — J34.89 NASAL PAIN: Primary | ICD-10-CM

## 2021-04-29 DIAGNOSIS — J34.0 CELLULITIS OF NASAL TIP: Primary | ICD-10-CM

## 2021-04-29 PROCEDURE — 3008F BODY MASS INDEX DOCD: CPT | Performed by: FAMILY MEDICINE

## 2021-04-29 PROCEDURE — 99212 OFFICE O/P EST SF 10 MIN: CPT | Performed by: FAMILY MEDICINE

## 2021-04-29 PROCEDURE — 99213 OFFICE O/P EST LOW 20 MIN: CPT | Performed by: OTOLARYNGOLOGY

## 2021-04-29 PROCEDURE — 3074F SYST BP LT 130 MM HG: CPT | Performed by: OTOLARYNGOLOGY

## 2021-04-29 PROCEDURE — 3075F SYST BP GE 130 - 139MM HG: CPT | Performed by: FAMILY MEDICINE

## 2021-04-29 PROCEDURE — 3079F DIAST BP 80-89 MM HG: CPT | Performed by: OTOLARYNGOLOGY

## 2021-04-29 PROCEDURE — 3080F DIAST BP >= 90 MM HG: CPT | Performed by: FAMILY MEDICINE

## 2021-04-29 PROCEDURE — 3008F BODY MASS INDEX DOCD: CPT | Performed by: OTOLARYNGOLOGY

## 2021-04-29 RX ORDER — METHYLPREDNISOLONE 4 MG/1
TABLET ORAL
Qty: 1 PACKAGE | Refills: 0 | Status: SHIPPED | OUTPATIENT
Start: 2021-04-29 | End: 2021-08-25 | Stop reason: ALTCHOICE

## 2021-04-29 RX ORDER — CLINDAMYCIN HYDROCHLORIDE 300 MG/1
300 CAPSULE ORAL EVERY 8 HOURS
Qty: 21 CAPSULE | Refills: 0 | Status: SHIPPED | OUTPATIENT
Start: 2021-04-29 | End: 2021-05-06

## 2021-04-29 RX ORDER — SULFAMETHOXAZOLE AND TRIMETHOPRIM 800; 160 MG/1; MG/1
1 TABLET ORAL 2 TIMES DAILY
COMMUNITY
Start: 2021-02-23 | End: 2021-08-25 | Stop reason: ALTCHOICE

## 2021-04-29 NOTE — PROGRESS NOTES
Missael Torres is a 38year old male.   Patient presents with:  Nose Problem: c/o pain on the bridge of his nose for 2 weeks      HISTORY OF PRESENT ILLNESS  He presents with 2-month history of enlarging area of abnormal skin under his jawline on the righ Radiation Treatments: No      Family History   Problem Relation Age of Onset   • Cancer Mother 50        Uterine ca   • Ovarian Cancer Mother    • Cancer Sister    • Genetic Disease Sister         Neurofibromatosis   • Breast Cancer Sister    • Other (Othe Thyroid gland - Normal.   Eyes Normal Conjunctiva - Right: Normal, Left: Normal. Pupil - Right: Normal, Left: Normal. Fundus - Right: Normal, Left: Normal.   Neurological Normal Memory - Normal. Cranial nerves - Cranial nerves II through XII grossly intact taking: Reported on 4/29/2021 ), Disp: 30 tablet, Rfl: 0  •  HYDROcodone-acetaminophen (NORCO)  MG Oral Tab, Take 1 tablet by mouth every 6 (six) hours as needed for Pain.  (Patient not taking: Reported on 4/29/2021 ), Disp: 40 tablet, Rfl: 0  •  Acet

## 2021-04-29 NOTE — PROGRESS NOTES
Blood pressure (!) 134/91, pulse 82, height 6' 2\" (1.88 m), weight 271 lb (122.9 kg). Patient presents today complaining of pain at the bridge of his nose for the past 10 to 14 days. No trauma. No discharge. Pain can be severe at times.     Objective

## 2021-05-11 ENCOUNTER — OFFICE VISIT (OUTPATIENT)
Dept: FAMILY MEDICINE CLINIC | Facility: CLINIC | Age: 36
End: 2021-05-11
Payer: COMMERCIAL

## 2021-05-11 VITALS
BODY MASS INDEX: 34.78 KG/M2 | HEIGHT: 74 IN | HEART RATE: 94 BPM | DIASTOLIC BLOOD PRESSURE: 76 MMHG | WEIGHT: 271 LBS | SYSTOLIC BLOOD PRESSURE: 129 MMHG

## 2021-05-11 DIAGNOSIS — E78.5 HYPERLIPIDEMIA, UNSPECIFIED HYPERLIPIDEMIA TYPE: Primary | ICD-10-CM

## 2021-05-11 DIAGNOSIS — K52.9 CHRONIC DIARRHEA: ICD-10-CM

## 2021-05-11 PROCEDURE — 3008F BODY MASS INDEX DOCD: CPT | Performed by: FAMILY MEDICINE

## 2021-05-11 PROCEDURE — 99213 OFFICE O/P EST LOW 20 MIN: CPT | Performed by: FAMILY MEDICINE

## 2021-05-11 PROCEDURE — 3074F SYST BP LT 130 MM HG: CPT | Performed by: FAMILY MEDICINE

## 2021-05-11 PROCEDURE — 3078F DIAST BP <80 MM HG: CPT | Performed by: FAMILY MEDICINE

## 2021-05-11 NOTE — PROGRESS NOTES
Blood pressure 129/76, pulse 94, height 6' 2\" (1.88 m), weight 271 lb (122.9 kg). Patient presents today following up for rash on the arms that he gets every year and during the summer. He has been taking lansoprazole for many years.   Has diarrhea 5-6

## 2021-05-13 ENCOUNTER — OFFICE VISIT (OUTPATIENT)
Dept: OTOLARYNGOLOGY | Facility: CLINIC | Age: 36
End: 2021-05-13
Payer: COMMERCIAL

## 2021-05-13 VITALS
SYSTOLIC BLOOD PRESSURE: 139 MMHG | BODY MASS INDEX: 32.08 KG/M2 | HEIGHT: 74 IN | TEMPERATURE: 98 F | WEIGHT: 250 LBS | DIASTOLIC BLOOD PRESSURE: 88 MMHG

## 2021-05-13 DIAGNOSIS — J34.0 CELLULITIS OF NASAL TIP: Primary | ICD-10-CM

## 2021-05-13 PROCEDURE — 3075F SYST BP GE 130 - 139MM HG: CPT | Performed by: OTOLARYNGOLOGY

## 2021-05-13 PROCEDURE — 99213 OFFICE O/P EST LOW 20 MIN: CPT | Performed by: OTOLARYNGOLOGY

## 2021-05-13 PROCEDURE — 3079F DIAST BP 80-89 MM HG: CPT | Performed by: OTOLARYNGOLOGY

## 2021-05-13 PROCEDURE — 3008F BODY MASS INDEX DOCD: CPT | Performed by: OTOLARYNGOLOGY

## 2021-05-13 NOTE — PROGRESS NOTES
Temo Clark is a 38year old male. Patient presents with:   Other: patient is here for follow up on cellulitis of nasal tip stated feeling much better ,no more pain      HISTORY OF PRESENT ILLNESS  He presents with 2-month history of enlarging area of Concerns:        Caffeine Concern: No        Exercise:  Yes          Karate, twice per week for 1 hour        Left Handed: No        Right Handed: Yes        Currently spends a great deal of time in the sun: No        History of tanning: No        Hx of Spe PHYSICAL EXAM    /88   Temp 97.6 °F (36.4 °C) (Tympanic)   Ht 6' 2\" (1.88 m)   Wt 250 lb (113.4 kg)   BMI 32.10 kg/m²        Constitutional Normal Overall appearance - Normal.   Psychiatric Normal Orientation - Oriented to time, place, per on 5/11/2021 ), Disp: 1 Tube, Rfl: 0  •  levofloxacin 500 MG Oral Tab, Take 1 tablet (500 mg total) by mouth daily.  (Patient not taking: Reported on 4/29/2021 ), Disp: 30 tablet, Rfl: 0  •  HYDROcodone-acetaminophen (NORCO)  MG Oral Tab, Take 1 table

## 2021-05-15 ENCOUNTER — LAB ENCOUNTER (OUTPATIENT)
Dept: LAB | Age: 36
End: 2021-05-15
Attending: FAMILY MEDICINE
Payer: COMMERCIAL

## 2021-05-15 DIAGNOSIS — E78.5 HYPERLIPIDEMIA, UNSPECIFIED HYPERLIPIDEMIA TYPE: ICD-10-CM

## 2021-05-15 DIAGNOSIS — K52.9 CHRONIC DIARRHEA: ICD-10-CM

## 2021-05-15 PROCEDURE — 80053 COMPREHEN METABOLIC PANEL: CPT

## 2021-05-15 PROCEDURE — 84443 ASSAY THYROID STIM HORMONE: CPT

## 2021-05-15 PROCEDURE — 36415 COLL VENOUS BLD VENIPUNCTURE: CPT

## 2021-05-15 PROCEDURE — 80061 LIPID PANEL: CPT

## 2021-05-17 ENCOUNTER — LAB ENCOUNTER (OUTPATIENT)
Dept: LAB | Age: 36
End: 2021-05-17
Attending: FAMILY MEDICINE
Payer: COMMERCIAL

## 2021-05-17 PROCEDURE — 87493 C DIFF AMPLIFIED PROBE: CPT

## 2021-05-17 PROCEDURE — 87329 GIARDIA AG IA: CPT

## 2021-05-17 PROCEDURE — 87427 SHIGA-LIKE TOXIN AG IA: CPT

## 2021-05-17 PROCEDURE — 87045 FECES CULTURE AEROBIC BACT: CPT

## 2021-05-17 PROCEDURE — 87046 STOOL CULTR AEROBIC BACT EA: CPT

## 2021-05-17 PROCEDURE — 87272 CRYPTOSPORIDIUM AG IF: CPT

## 2021-06-11 ENCOUNTER — NURSE TRIAGE (OUTPATIENT)
Dept: FAMILY MEDICINE CLINIC | Facility: CLINIC | Age: 36
End: 2021-06-11

## 2021-06-11 NOTE — TELEPHONE ENCOUNTER
Action Requested: Summary for Provider     []  Critical Lab, Recommendations Needed  [] Need Additional Advice  []   FYI    []   Need Orders  [] Need Medications Sent to Pharmacy  []  Other     SUMMARY:     Swelling to both upper eyelids/ along the eyelash

## 2021-06-12 ENCOUNTER — OFFICE VISIT (OUTPATIENT)
Dept: FAMILY MEDICINE CLINIC | Facility: CLINIC | Age: 36
End: 2021-06-12
Payer: COMMERCIAL

## 2021-06-12 VITALS
HEART RATE: 93 BPM | HEIGHT: 74 IN | BODY MASS INDEX: 33.37 KG/M2 | SYSTOLIC BLOOD PRESSURE: 119 MMHG | WEIGHT: 260 LBS | DIASTOLIC BLOOD PRESSURE: 75 MMHG

## 2021-06-12 DIAGNOSIS — H01.001 BLEPHARITIS OF UPPER EYELIDS OF BOTH EYES, UNSPECIFIED TYPE: Primary | ICD-10-CM

## 2021-06-12 DIAGNOSIS — H01.004 BLEPHARITIS OF UPPER EYELIDS OF BOTH EYES, UNSPECIFIED TYPE: Primary | ICD-10-CM

## 2021-06-12 PROCEDURE — 3074F SYST BP LT 130 MM HG: CPT | Performed by: FAMILY MEDICINE

## 2021-06-12 PROCEDURE — 3078F DIAST BP <80 MM HG: CPT | Performed by: FAMILY MEDICINE

## 2021-06-12 PROCEDURE — 99213 OFFICE O/P EST LOW 20 MIN: CPT | Performed by: FAMILY MEDICINE

## 2021-06-12 PROCEDURE — 3008F BODY MASS INDEX DOCD: CPT | Performed by: FAMILY MEDICINE

## 2021-06-12 RX ORDER — NEOMYCIN SULFATE, POLYMYXIN B SULFATE, BACITRACIN ZINC, HYDROCORTISONE 3.5; 10000; 400; 1 MG/G; [USP'U]/G; [USP'U]/G; MG/G
OINTMENT OPHTHALMIC
Qty: 3.5 G | Refills: 0 | Status: SHIPPED | OUTPATIENT
Start: 2021-06-12 | End: 2021-08-25 | Stop reason: ALTCHOICE

## 2021-06-12 NOTE — PATIENT INSTRUCTIONS
Blepharitis    Blepharitis is an inflammation of the eyelid. It results in swelling of the eyelids, and it is often caused by a bacterial infection or a skin condition. Blepharitis is a common eye condition. There are two types.  Anterior blepharitis occu times a day, to loosen the crust. Then, wipe away scales or crust from the eyelids. · After applying the warm compress, gently scrub the base of the eyelashes for almost 15 seconds per eyelid.  To do this, close your eyes and use a moist eyelid cleansing w

## 2021-06-12 NOTE — PROGRESS NOTES
Dayana He is a 38year old male.   HPI:   Patient here presenting with 4 days history of noted edema and erythema in upper eyelids bilaterally, he has noted fluctuation in the right eye with worsening of symptoms at night but left eye is persistent, History    Tobacco Use      Smoking status: Current Every Day Smoker        Packs/day: 0.50        Years: 12.00        Pack years: 6        Types: Cigarettes      Smokeless tobacco: Current User    Vaping Use      Vaping Use: Former    Alcohol use:  Yes

## 2021-08-04 RX ORDER — LANSOPRAZOLE 30 MG/1
30 CAPSULE, DELAYED RELEASE ORAL 2 TIMES DAILY
Qty: 180 CAPSULE | Refills: 1 | Status: SHIPPED | OUTPATIENT
Start: 2021-08-04 | End: 2022-01-17

## 2021-08-04 NOTE — TELEPHONE ENCOUNTER
Refill passed per [address] [place] [address] protocol.      Requested Prescriptions   Pending Prescriptions Disp Refills    LANSOPRAZOLE 30 MG Oral Capsule Delayed Release [Pharmacy Med Name: LANSOPRAZOLE 30MG DR CAPSULES] 180 capsule 1     Sig: TAKE 1 CAPSULE(30 MG) BY MOUTH TWICE DAILY        Gastrointestional Medication Protocol Passed - 8/4/2021  5:51 AM        Passed - Appointment in past 15 or next 3 months               Future Appointments         Provider Department Appt Notes    In 3 weeks Maru Lipscomb, , [address] [place] [address] Chronic diarrhea             Recent Outpatient Visits              1 month ago Blepharitis of upper eyelids of both eyes, unspecified type    [address], Cece Levy MD    Office Visit    2 months ago Cellulitis of nasal tip    [facility], [address], [place], Braxton Schwab, MD    Office Visit    2 months ago Hyperlipidemia, unspecified hyperlipidemia type    Pushpa Tapia DO    Office Visit    3 months ago Cellulitis of nasal tip    [facility], [address], [place], Braxton Schwab, MD    Office Visit    3 months ago Nasal pain    Pushpa Tapia DO    Office Visit

## 2021-08-11 NOTE — H&P
[address] [facility] [address] Gastroenterology                                                                                                  Clinic History and Physical     Pa Date   • Anxiety state    • Calculus of kidney    • Cellulitis due to MRSA     left knee   • Esophageal reflux    • Migraines    • Nephrolithiasis     cystoscopy   • Tonsillitis    • Torn meniscus     arthroscopy   • Visual impairment     wears contact raj pain  GASTROINTESTINAL:  see HPI  GENITOURINARY:  negative for dysuria or gross hematuria  INTEGUMENT/BREAST:  SKIN:  negative for jaundice   ALLERGIC/IMMUNOLOGIC:  negative for hay fever  ENDOCRINE:  negative for cold intolerance and heat intolerance  MUS also recommend consideration for colonoscopy to evaluate the patient's symptoms further. He would strongly agree with this. I reviewed the colonoscopy procedure, questions/concerns were addressed, the patient is agreeable to proceeding.   In the event yudith etc.] as indicated.             Orders This Visit:  Orders Placed This Encounter      Immunoglobulin A, Quant      Tissue Transglutaminase Ab, IgA      Meds This Visit:  Requested Prescriptions     Signed Prescriptions Disp Refills   • PEG 3350-KCl-Na Genevieve

## 2021-08-25 ENCOUNTER — TELEPHONE (OUTPATIENT)
Dept: GASTROENTEROLOGY | Facility: CLINIC | Age: 36
End: 2021-08-25

## 2021-08-25 ENCOUNTER — OFFICE VISIT (OUTPATIENT)
Dept: GASTROENTEROLOGY | Facility: CLINIC | Age: 36
End: 2021-08-25
Payer: COMMERCIAL

## 2021-08-25 VITALS
BODY MASS INDEX: 32.34 KG/M2 | HEIGHT: 74 IN | WEIGHT: 252 LBS | HEART RATE: 108 BPM | DIASTOLIC BLOOD PRESSURE: 75 MMHG | SYSTOLIC BLOOD PRESSURE: 131 MMHG

## 2021-08-25 DIAGNOSIS — R10.9 ABDOMINAL CRAMPING: ICD-10-CM

## 2021-08-25 DIAGNOSIS — Z12.11 COLON CANCER SCREENING: Primary | ICD-10-CM

## 2021-08-25 DIAGNOSIS — R63.4 WEIGHT LOSS: ICD-10-CM

## 2021-08-25 DIAGNOSIS — R19.4 ALTERED BOWEL HABITS: Primary | ICD-10-CM

## 2021-08-25 PROCEDURE — 3078F DIAST BP <80 MM HG: CPT | Performed by: NURSE PRACTITIONER

## 2021-08-25 PROCEDURE — 3075F SYST BP GE 130 - 139MM HG: CPT | Performed by: NURSE PRACTITIONER

## 2021-08-25 PROCEDURE — 3008F BODY MASS INDEX DOCD: CPT | Performed by: NURSE PRACTITIONER

## 2021-08-25 PROCEDURE — 99243 OFF/OP CNSLTJ NEW/EST LOW 30: CPT | Performed by: NURSE PRACTITIONER

## 2021-08-25 RX ORDER — POLYETHYLENE GLYCOL 3350, SODIUM CHLORIDE, SODIUM BICARBONATE, POTASSIUM CHLORIDE 420; 11.2; 5.72; 1.48 G/4L; G/4L; G/4L; G/4L
POWDER, FOR SOLUTION ORAL
Qty: 4000 ML | Refills: 0 | Status: SHIPPED | OUTPATIENT
Start: 2021-08-25 | End: 2021-10-29

## 2021-08-25 NOTE — TELEPHONE ENCOUNTER
Scheduled for:  Colonoscopy [address]  Provider Name:  Dr Geoffrey Rondon  Date:  10/29/2021  [facility]  Sedation:  MAC  Time:  9:00am (pt is aware to arrive at 0800)  Prep:  Colyte  Meds/Allergies Reconciled?:  Nikki/APN reviewed.   Diagnosis with codes:  Colon C

## 2021-10-06 ENCOUNTER — TELEPHONE (OUTPATIENT)
Dept: GASTROENTEROLOGY | Facility: CLINIC | Age: 36
End: 2021-10-06

## 2021-10-06 NOTE — TELEPHONE ENCOUNTER
Overdue reminder letter mailed out to patient.     Labs:   IMMUNOGLOBULIN A, QUANT   TISSUE TRANSGLUTAMINASE AB IGA

## 2021-10-12 ENCOUNTER — LAB ENCOUNTER (OUTPATIENT)
Dept: LAB | Age: 36
End: 2021-10-12
Attending: NURSE PRACTITIONER
Payer: COMMERCIAL

## 2021-10-12 DIAGNOSIS — R10.9 ABDOMINAL CRAMPING: ICD-10-CM

## 2021-10-12 DIAGNOSIS — R19.4 ALTERED BOWEL HABITS: ICD-10-CM

## 2021-10-12 PROCEDURE — 83516 IMMUNOASSAY NONANTIBODY: CPT

## 2021-10-12 PROCEDURE — 36415 COLL VENOUS BLD VENIPUNCTURE: CPT | Performed by: NURSE PRACTITIONER

## 2021-10-12 PROCEDURE — 82784 ASSAY IGA/IGD/IGG/IGM EACH: CPT | Performed by: NURSE PRACTITIONER

## 2021-10-18 NOTE — TELEPHONE ENCOUNTER
[employer] office - [phone] and spoke with [email or web address]. She stated that since pt is not 48 yrs or older, the CLN will not be covered.  If pt has a strong family hx or other risk factors, then provider can create a medical necessity letter and have

## 2021-10-18 NOTE — TELEPHONE ENCOUNTER
Jodi Cuveas    Please see below message. Patient saw you in office on 8/25/2021. Insurance will not cover screening because he is less than 50 per below message unless he has strong family history or other risk factors.     Would you be able to assist with med

## 2021-10-18 NOTE — TELEPHONE ENCOUNTER
Nursing: I have pended a letter which can be sent to the patient's insurance. Please let me know if anything further is needed.

## 2021-10-22 NOTE — TELEPHONE ENCOUNTER
Иван Tapia reviewed reasons for procedure provided in letter with the insurance provider because patient not old enough for a screening otherwise.     FYI PPD-      I called the fund office at [phone] and was told that [facility] handles prior au

## 2021-10-22 NOTE — TELEPHONE ENCOUNTER
Called [facility] [phone], spoke with Abilio. Gave case # [id number]. Asked if clinical received. She confirmed was received and is still pending review.

## 2021-10-26 ENCOUNTER — LAB ENCOUNTER (OUTPATIENT)
Dept: LAB | Age: 36
End: 2021-10-26
Attending: INTERNAL MEDICINE
Payer: COMMERCIAL

## 2021-10-26 DIAGNOSIS — Z01.818 PRE-OP TESTING: ICD-10-CM

## 2021-10-29 ENCOUNTER — HOSPITAL ENCOUNTER (OUTPATIENT)
Age: 36
Setting detail: HOSPITAL OUTPATIENT SURGERY
Discharge: HOME | End: 2021-10-29
Attending: INTERNAL MEDICINE | Admitting: INTERNAL MEDICINE
Payer: COMMERCIAL

## 2021-10-29 ENCOUNTER — ANESTHESIA (OUTPATIENT)
Dept: ENDOSCOPY | Age: 36
End: 2021-10-29
Payer: COMMERCIAL

## 2021-10-29 ENCOUNTER — ANESTHESIA EVENT (OUTPATIENT)
Dept: ENDOSCOPY | Age: 36
End: 2021-10-29
Payer: COMMERCIAL

## 2021-10-29 VITALS
WEIGHT: 251 LBS | DIASTOLIC BLOOD PRESSURE: 92 MMHG | HEART RATE: 70 BPM | HEIGHT: 74 IN | SYSTOLIC BLOOD PRESSURE: 133 MMHG | RESPIRATION RATE: 18 BRPM | BODY MASS INDEX: 32.21 KG/M2 | OXYGEN SATURATION: 98 %

## 2021-10-29 DIAGNOSIS — Z01.818 PRE-OP TESTING: Primary | ICD-10-CM

## 2021-10-29 DIAGNOSIS — Z12.11 COLON CANCER SCREENING: ICD-10-CM

## 2021-10-29 PROCEDURE — 45380 COLONOSCOPY AND BIOPSY: CPT | Performed by: INTERNAL MEDICINE

## 2021-10-29 PROCEDURE — 99070 SPECIAL SUPPLIES PHYS/QHP: CPT | Performed by: INTERNAL MEDICINE

## 2021-10-29 RX ORDER — LIDOCAINE HYDROCHLORIDE 10 MG/ML
INJECTION, SOLUTION EPIDURAL; INFILTRATION; INTRACAUDAL; PERINEURAL AS NEEDED
Status: DISCONTINUED | OUTPATIENT
Start: 2021-10-29 | End: 2021-10-29 | Stop reason: SURG

## 2021-10-29 RX ORDER — SODIUM CHLORIDE, SODIUM LACTATE, POTASSIUM CHLORIDE, CALCIUM CHLORIDE 600; 310; 30; 20 MG/100ML; MG/100ML; MG/100ML; MG/100ML
INJECTION, SOLUTION INTRAVENOUS CONTINUOUS
Status: DISCONTINUED | OUTPATIENT
Start: 2021-10-29 | End: 2021-10-29

## 2021-10-29 RX ADMIN — SODIUM CHLORIDE, SODIUM LACTATE, POTASSIUM CHLORIDE, CALCIUM CHLORIDE: 600; 310; 30; 20 INJECTION, SOLUTION INTRAVENOUS at 09:19:00

## 2021-10-29 RX ADMIN — LIDOCAINE HYDROCHLORIDE 25 MG: 10 INJECTION, SOLUTION EPIDURAL; INFILTRATION; INTRACAUDAL; PERINEURAL at 09:19:00

## 2021-10-29 NOTE — OPERATIVE REPORT
[facility] Endoscopy Report      Preoperative Diagnosis:  - altered bowel habits      Postoperative Diagnosis:  - internal hemorrhoids      Procedure:    Colonoscopy       Surgeon:  Marylu Edward M.D.     Anesthesia:  MAC sedation     Techn

## 2021-10-29 NOTE — ANESTHESIA POSTPROCEDURE EVALUATION
Patient: Floresita Briones    Procedure Summary     Date: 10/29/21 Room / Location: [facility] 01 / [facility] 1 ENDO    Anesthesia Start: 9088 Anesthesia Stop: 9932    Procedure: COLONOSCOPY (N/A ) Diagnosis:       Colon cancer screening      (hemorrhoids)

## 2021-10-29 NOTE — H&P
History & Physical Examination    Patient Name: Isreal Jiang  MRN: [id number]  CSN: [id number]  YOB: 1985    Diagnosis:     Altered bowel habits  Abdominal cramping      PEG 3350-KCl-Na Bicarb-NaCl (TRILYTE) 420 g Oral Recon Soln, Take pr normal, please explain:   HEENT [x ] [ x]    NECK & BACK [x ] [x ]    HEART [x ] [ x]    LUNGS [x ] [ x]    ABDOMEN [x ] [x ]    UROGENITAL [ ] [ ]    EXTREMITIES [x ] [x ]    OTHER        [ x ] I have discussed the risks and benefits and alternatives with

## 2021-10-29 NOTE — ANESTHESIA PREPROCEDURE EVALUATION
Anesthesia PreOp Note    HPI:     Bhupendra Paiz is a 43year old male who presents for preoperative consultation requested by: Belkis Georges MD    Date of Surgery: 10/29/2021    Procedure(s):  COLONOSCOPY  Indication: Colon cancer screening    Relev 11/30/2018   • REPR CMPL WND HEAD,FAC,HAND 2.6-7.5  09/16/2019       PEG 3350-KCl-Na Bicarb-NaCl (TRILYTE) 420 g Oral Recon Soln, Take prep as directed by gastro office.  May substitute with Trilyte/generic equivalent if needed, Disp: 4000 mL, Rfl: 0  lanso Exercise:  Yes          Karate, twice per week for 1 hour        Bike Helmet: Not Asked        Seat Belt: Not Asked        Self-Exams: Not Asked        Left Handed: No        Right Handed: Yes        Currently spends a great deal of time in the sun: No file    Available pre-op labs reviewed. Vital Signs: Body mass index is 32.23 kg/m². height is 1.88 m (6' 2\") and weight is 113.9 kg (251 lb). His blood pressure is 130/89 and his pulse is 62.  His respiration is 13 and oxygen saturation is

## 2021-11-02 ENCOUNTER — TELEPHONE (OUTPATIENT)
Dept: GASTROENTEROLOGY | Facility: CLINIC | Age: 36
End: 2021-11-02

## 2021-11-02 NOTE — TELEPHONE ENCOUNTER
Ernie Ware MD  P  Gi Clinical Staff; JONG Morales  I wanted to get back to you with your colonoscopy results.  Overall the colon appeared normal, samples taken from the colon lining were negative for inflammation/colitis.  You do have vee

## 2021-11-03 NOTE — TELEPHONE ENCOUNTER
Patient viewed below result note in [address]   Seen by patient Bhupendra Paiz on 11/1/2021  6:01 PM

## 2021-11-18 ENCOUNTER — IMMUNIZATION (OUTPATIENT)
Dept: LAB | Facility: HOSPITAL | Age: 36
End: 2021-11-18
Attending: EMERGENCY MEDICINE
Payer: COMMERCIAL

## 2021-11-18 DIAGNOSIS — Z23 NEED FOR VACCINATION: Primary | ICD-10-CM

## 2021-11-18 PROCEDURE — 0004A SARSCOV2 VAC 30MCG/0.3ML IM: CPT

## 2021-11-22 NOTE — PROGRESS NOTES
Blood pressure 138/85, pulse 90, height 6' 2\" (1.88 m), weight 265 lb (120.2 kg). Patient presents today following up for anxiety.   He reports that he is seeing a therapist.  He also reports that he uses marijuana and mushrooms for calming effect and r

## 2022-01-17 RX ORDER — LANSOPRAZOLE 30 MG/1
30 CAPSULE, DELAYED RELEASE ORAL 2 TIMES DAILY
Qty: 180 CAPSULE | Refills: 1 | Status: SHIPPED | OUTPATIENT
Start: 2022-01-17

## 2022-01-17 NOTE — TELEPHONE ENCOUNTER
Refill passed per [facility] protocol   Requested Prescriptions   Pending Prescriptions Disp Refills    LANSOPRAZOLE 30 MG Oral Capsule Delayed Release [Pharmacy Med Name: LANSOPRAZOLE 30MG DR CAPSULES] 180 capsule 1     Sig: TAKE 1 CAPSULE(30 MG) BY

## 2022-02-14 RX ORDER — ESCITALOPRAM OXALATE 10 MG/1
10 TABLET ORAL DAILY
Qty: 90 TABLET | Refills: 0 | Status: SHIPPED | OUTPATIENT
Start: 2022-02-14 | End: 2022-03-08

## 2022-02-14 NOTE — TELEPHONE ENCOUNTER
Refill passed per [facility] protocol.   Patient has physical scheduled 3/8/22    Requested Prescriptions   Pending Prescriptions Disp Refills    ESCITALOPRAM 10 MG Oral Tab [Pharmacy Med Name: ESCITALOPRAM 10MG TABLETS] 30 tablet 2     Sig: TAKE 1 TABLET(10 MG) BY MOUTH DAILY        Psychiatric Non-Scheduled (Anti-Anxiety) Passed - 2/14/2022 10:59 AM        Passed - Appointment in last 6 or next 3 months              Recent Outpatient Visits              2 months ago QIAN (generalized anxiety disorder)    [address] [facility], DO    Office Visit    4 months ago Right shoulder pain, unspecified chronicity    Orthopaedics - [address] Sreek[address] Duncan MD    Office Visit    5 months ago Altered bowel habits    [place] [employer] [place], [facility], [address] [facility], [facility], APRN    Office Visit    8 months ago Blepharitis of upper eyelids of both eyes, unspecified type    [address] [facility] Raven Gurrola MD    Office Visit    9 months ago Cellulitis of nasal tip    [facility], [address], Lety Meyer MD    Office Visit            Future Appointments         Provider Department Appt Notes    In 3 weeks Cayden Restrepo DO [facility] Physical

## 2022-03-08 ENCOUNTER — OFFICE VISIT (OUTPATIENT)
Dept: FAMILY MEDICINE CLINIC | Facility: CLINIC | Age: 37
End: 2022-03-08
Payer: COMMERCIAL

## 2022-03-08 VITALS
HEART RATE: 73 BPM | HEIGHT: 74 IN | WEIGHT: 263 LBS | SYSTOLIC BLOOD PRESSURE: 126 MMHG | BODY MASS INDEX: 33.75 KG/M2 | DIASTOLIC BLOOD PRESSURE: 79 MMHG

## 2022-03-08 DIAGNOSIS — F41.1 GAD (GENERALIZED ANXIETY DISORDER): ICD-10-CM

## 2022-03-08 DIAGNOSIS — Z00.00 ROUTINE PHYSICAL EXAMINATION: Primary | ICD-10-CM

## 2022-03-08 DIAGNOSIS — G47.33 OSA (OBSTRUCTIVE SLEEP APNEA): ICD-10-CM

## 2022-03-08 DIAGNOSIS — E78.5 HYPERLIPIDEMIA, UNSPECIFIED HYPERLIPIDEMIA TYPE: ICD-10-CM

## 2022-03-08 DIAGNOSIS — R22.1 NECK MASS: ICD-10-CM

## 2022-03-08 PROCEDURE — 3008F BODY MASS INDEX DOCD: CPT | Performed by: FAMILY MEDICINE

## 2022-03-08 PROCEDURE — 90471 IMMUNIZATION ADMIN: CPT | Performed by: FAMILY MEDICINE

## 2022-03-08 PROCEDURE — 90715 TDAP VACCINE 7 YRS/> IM: CPT | Performed by: FAMILY MEDICINE

## 2022-03-08 PROCEDURE — 3074F SYST BP LT 130 MM HG: CPT | Performed by: FAMILY MEDICINE

## 2022-03-08 PROCEDURE — 3078F DIAST BP <80 MM HG: CPT | Performed by: FAMILY MEDICINE

## 2022-03-08 PROCEDURE — 99395 PREV VISIT EST AGE 18-39: CPT | Performed by: FAMILY MEDICINE

## 2022-03-08 PROCEDURE — 99213 OFFICE O/P EST LOW 20 MIN: CPT | Performed by: FAMILY MEDICINE

## 2022-03-08 RX ORDER — ESCITALOPRAM OXALATE 20 MG/1
20 TABLET ORAL DAILY
Qty: 90 TABLET | Refills: 3 | Status: SHIPPED | OUTPATIENT
Start: 2022-03-08

## 2022-03-11 ENCOUNTER — OFFICE VISIT (OUTPATIENT)
Dept: OTOLARYNGOLOGY | Facility: CLINIC | Age: 37
End: 2022-03-11
Payer: COMMERCIAL

## 2022-03-11 ENCOUNTER — TELEPHONE (OUTPATIENT)
Dept: OTOLARYNGOLOGY | Facility: CLINIC | Age: 37
End: 2022-03-11

## 2022-03-11 VITALS — BODY MASS INDEX: 33.37 KG/M2 | WEIGHT: 260 LBS | HEIGHT: 74 IN

## 2022-03-11 DIAGNOSIS — L72.0 EPIDERMAL CYST OF NECK: Primary | ICD-10-CM

## 2022-03-11 PROCEDURE — 99213 OFFICE O/P EST LOW 20 MIN: CPT | Performed by: OTOLARYNGOLOGY

## 2022-03-11 PROCEDURE — 3008F BODY MASS INDEX DOCD: CPT | Performed by: OTOLARYNGOLOGY

## 2022-03-11 RX ORDER — SULFAMETHOXAZOLE AND TRIMETHOPRIM 800; 160 MG/1; MG/1
1 TABLET ORAL EVERY 12 HOURS
Qty: 14 TABLET | Refills: 0 | Status: SHIPPED | OUTPATIENT
Start: 2022-03-11 | End: 2022-03-24 | Stop reason: ALTCHOICE

## 2022-03-11 NOTE — TELEPHONE ENCOUNTER
Patient is scheduled for procedure on 3/16/22 at [facility] with Dr. Nisa Boone.

## 2022-03-14 ENCOUNTER — TELEPHONE (OUTPATIENT)
Dept: OTOLARYNGOLOGY | Facility: CLINIC | Age: 37
End: 2022-03-14

## 2022-03-14 NOTE — TELEPHONE ENCOUNTER
Received  A call from [facility] stated patient need to get prior auth for surgery 0n 3/16/22 thru [facility] but no record of pt , please call #[phone].

## 2022-03-16 ENCOUNTER — TELEPHONE (OUTPATIENT)
Dept: OTOLARYNGOLOGY | Facility: CLINIC | Age: 37
End: 2022-03-16

## 2022-03-16 ENCOUNTER — LAB REQUISITION (OUTPATIENT)
Dept: SURGERY | Age: 37
End: 2022-03-16
Payer: COMMERCIAL

## 2022-03-16 PROCEDURE — 88304 TISSUE EXAM BY PATHOLOGIST: CPT | Performed by: OTOLARYNGOLOGY

## 2022-03-16 PROCEDURE — 88305 TISSUE EXAM BY PATHOLOGIST: CPT | Performed by: OTOLARYNGOLOGY

## 2022-03-16 RX ORDER — CEPHALEXIN 500 MG/1
500 CAPSULE ORAL EVERY 8 HOURS
Qty: 21 CAPSULE | Refills: 0 | Status: SHIPPED | OUTPATIENT
Start: 2022-03-16

## 2022-03-16 RX ORDER — HYDROCODONE BITARTRATE AND ACETAMINOPHEN 5; 325 MG/1; MG/1
1 TABLET ORAL EVERY 6 HOURS PRN
Qty: 20 TABLET | Refills: 0 | Status: SHIPPED | OUTPATIENT
Start: 2022-03-16 | End: 2022-03-24 | Stop reason: ALTCHOICE

## 2022-03-17 ENCOUNTER — TELEPHONE (OUTPATIENT)
Dept: OTOLARYNGOLOGY | Facility: CLINIC | Age: 37
End: 2022-03-17

## 2022-03-18 NOTE — TELEPHONE ENCOUNTER
Post op day 2 excision and closure of right neck cyst ,pt stated he is doing fine no bleeding ,no fever,dressing dry and intact,reviewed post op medications,advised pt to keep dressing dry and intact,to call for any bleeding,fever greater than 101 or for any other concerns ,pt verbalized understanding and already scheduled post op follow up.

## 2022-03-24 ENCOUNTER — OFFICE VISIT (OUTPATIENT)
Dept: OTOLARYNGOLOGY | Facility: CLINIC | Age: 37
End: 2022-03-24
Payer: COMMERCIAL

## 2022-03-24 VITALS — BODY MASS INDEX: 33.37 KG/M2 | WEIGHT: 260 LBS | HEIGHT: 74 IN

## 2022-03-24 DIAGNOSIS — G47.33 OSA (OBSTRUCTIVE SLEEP APNEA): Primary | ICD-10-CM

## 2022-03-24 DIAGNOSIS — L72.0 EPIDERMAL CYST OF NECK: ICD-10-CM

## 2022-03-24 PROCEDURE — 99024 POSTOP FOLLOW-UP VISIT: CPT | Performed by: OTOLARYNGOLOGY

## 2022-03-24 PROCEDURE — 3008F BODY MASS INDEX DOCD: CPT | Performed by: OTOLARYNGOLOGY

## 2022-06-23 RX ORDER — LANSOPRAZOLE 30 MG/1
CAPSULE, DELAYED RELEASE ORAL
Qty: 180 CAPSULE | Refills: 1 | Status: SHIPPED | OUTPATIENT
Start: 2022-06-23

## 2022-09-22 ENCOUNTER — OFFICE VISIT (OUTPATIENT)
Dept: SLEEP CENTER | Age: 37
End: 2022-09-22
Attending: OTOLARYNGOLOGY

## 2022-09-22 DIAGNOSIS — G47.33 OSA (OBSTRUCTIVE SLEEP APNEA): ICD-10-CM

## 2022-09-22 PROCEDURE — 95806 SLEEP STUDY UNATT&RESP EFFT: CPT

## 2022-09-25 NOTE — PROCEDURES
[facility]  Accredited by the [facility] ([facility]ASM)    PATIENT'S NAME: Bryan Harrislpine   ATTENDING PHYSICIAN: Guillaume Latham. Vito Smith MD   REFERRING PHYSICIAN: Guillaume Latham. Vito Smith MD   PATIENT ACCOUNT #: [de-identified] LOCATION: [address] [place] [address] RECORD #: [id number] YOB: 1985   DATE OF STUDY: 09/22/2022       SLEEP STUDY REPORT    STUDY TYPE:  Home sleep test.    INDICATION:  Suspected obstructive sleep apnea (ICD-10 code G47.33) in patient with unrefreshing sleep, nocturnal awakening, strong family history of sleep apnea, occasional witnessed apneic event, an [place] Sleepiness Scale score of 9/24, and a body mass index of 34.0. RESULTS:  The patient underwent home sleep test with measurement of his nasal air flow, nasal air pressure, snoring, chest and abdominal wall motion, oximetry, and body position. I have reviewed the entirety of the raw data of this study. During the study, total recording time is 463 minutes. The lights-out clock time is 9:22 p.m., lights-on clock time is 4:46 a.m. There was 1 obstructive apneic event for an apnea index of 0.1 per hour. There were 18 hypopneic events for a hypopnea index of 2.3 events per hour. The combined apnea plus hypopnea index was 2.5 events per hour. There was no significant hypoventilation, Cheyne-Romeo breathing, or periodic breathing. The average oxygen saturation was 95%, the lowest oxygen saturation was 92%, the average desaturation was 3.3%. The oxygen desaturation index is 3.7 events per hour. Snoring was appreciated. The patient spent 222 minutes in the supine position, equivalent to 48% of the testing, and 240 minutes in the non-supine position, equivalent to 53% of the testing. The average heart rate was 63 beats per minute. INTERPRETATION:  The data generated from this study shows no evidence of significant sleep-disordered breathing. RECOMMENDATIONS:    1.    Clinical correlation is required. 2.   Weight loss. 3.   Avoid alcohol. 4.   Avoid sedating drug. 5.   Patient should not drive if at all sleepy. Please do not hesitate to contact me if there is any question whatsoever regarding interpretation of this study. Dictated By Norma Willis MD  d: 09/25/2022 13:05:17  t: 09/25/2022 15:46:04  [facility] [id number]  OLE/    cc: Farzad Lemus. DO Sam Sauceda MD

## 2023-01-20 RX ORDER — LANSOPRAZOLE 30 MG/1
30 CAPSULE, DELAYED RELEASE ORAL 2 TIMES DAILY
Qty: 180 CAPSULE | Refills: 1 | Status: SHIPPED | OUTPATIENT
Start: 2023-01-20

## 2023-01-20 NOTE — TELEPHONE ENCOUNTER
Refill passed per [place] [employer] protocol.     Requested Prescriptions   Pending Prescriptions Disp Refills    LANSOPRAZOLE 30 MG Oral Capsule Delayed Release [Pharmacy Med Name: LANSOPRAZOLE 30MG DR CAPSULES] 180 capsule 1     Sig: TAKE 1 CAPSULE(30 MG) BY MOUTH TWICE DAILY       Gastrointestional Medication Protocol Passed - 1/20/2023  5:51 AM        Passed - In person appointment or virtual visit in the past 12 mos or appointment in next 3 mos     Recent Outpatient Visits              4 months ago BALJIT (obstructive sleep apnea)    [address] [facility]    Office Visit    10 months ago ABLJIT (obstructive sleep apnea)    [facility], [address], Soledad Meyer MD    Office Visit    10 months ago Epidermal cyst of neck    [facility], [address], Soledad Meyer MD    Office Visit    10 months ago Routine physical examination    [facility], [address], [place], Ada Crocker, DO    Office Visit    1 year ago QIAN (generalized anxiety disorder)    [facility], [address], [place], Ada Comfort, DO    Office Visit                           Recent Outpatient Visits              4 months ago BALJIT (obstructive sleep apnea)    [address] [facility]    Office Visit    10 months ago BALJIT (obstructive sleep apnea)    [facility], [address], Soledad Meyer MD    Office Visit    10 months ago Epidermal cyst of neck    [facility], [address] [facility] [address], Soledad Meyer MD    Office Visit    10 months ago Routine physical examination    [facility], [address], [place], Ada Comfort, DO    Office Visit    1 year ago QIAN (generalized anxiety disorder)    [facility], [address], [place] [address], Ada Crocker, DO    Office Visit

## 2023-02-10 ENCOUNTER — OFFICE VISIT (OUTPATIENT)
Dept: FAMILY MEDICINE CLINIC | Facility: CLINIC | Age: 38
End: 2023-02-10

## 2023-02-10 VITALS
WEIGHT: 281 LBS | HEIGHT: 74 IN | HEART RATE: 86 BPM | DIASTOLIC BLOOD PRESSURE: 80 MMHG | BODY MASS INDEX: 36.06 KG/M2 | SYSTOLIC BLOOD PRESSURE: 136 MMHG

## 2023-02-10 DIAGNOSIS — L91.8 SKIN TAG: Primary | ICD-10-CM

## 2023-02-10 PROCEDURE — 3008F BODY MASS INDEX DOCD: CPT | Performed by: FAMILY MEDICINE

## 2023-02-10 PROCEDURE — 99212 OFFICE O/P EST SF 10 MIN: CPT | Performed by: FAMILY MEDICINE

## 2023-02-10 PROCEDURE — 3079F DIAST BP 80-89 MM HG: CPT | Performed by: FAMILY MEDICINE

## 2023-02-10 PROCEDURE — 3075F SYST BP GE 130 - 139MM HG: CPT | Performed by: FAMILY MEDICINE

## 2023-02-10 NOTE — PROGRESS NOTES
Blood pressure 136/80, pulse 86, height 6' 2\" (1.88 m), weight 281 lb (127.5 kg).     Requesting removal of skin tag    Objective skin tag noted right inguinal area    Assessment skin tag    Plan removal using pen cautery

## 2023-04-03 ENCOUNTER — PATIENT MESSAGE (OUTPATIENT)
Dept: FAMILY MEDICINE CLINIC | Facility: CLINIC | Age: 38
End: 2023-04-03

## 2023-08-09 ENCOUNTER — HOSPITAL ENCOUNTER (OUTPATIENT)
Age: 38
Discharge: HOME | End: 2023-08-09
Payer: COMMERCIAL

## 2023-08-09 VITALS
WEIGHT: 270 LBS | SYSTOLIC BLOOD PRESSURE: 137 MMHG | RESPIRATION RATE: 18 BRPM | DIASTOLIC BLOOD PRESSURE: 92 MMHG | HEART RATE: 105 BPM | BODY MASS INDEX: 34.65 KG/M2 | HEIGHT: 74 IN | TEMPERATURE: 97 F | OXYGEN SATURATION: 98 %

## 2023-08-09 DIAGNOSIS — H60.503 ACUTE OTITIS EXTERNA OF BOTH EARS, UNSPECIFIED TYPE: ICD-10-CM

## 2023-08-09 DIAGNOSIS — H66.90 ACUTE OTITIS MEDIA, UNSPECIFIED OTITIS MEDIA TYPE: Primary | ICD-10-CM

## 2023-08-09 PROCEDURE — 99213 OFFICE O/P EST LOW 20 MIN: CPT

## 2023-08-09 RX ORDER — OFLOXACIN 3 MG/ML
10 SOLUTION AURICULAR (OTIC) DAILY
Qty: 1 EACH | Refills: 0 | Status: SHIPPED | OUTPATIENT
Start: 2023-08-09 | End: 2023-08-16

## 2023-08-09 RX ORDER — AMOXICILLIN 875 MG/1
875 TABLET, COATED ORAL 2 TIMES DAILY
Qty: 20 TABLET | Refills: 0 | Status: SHIPPED | OUTPATIENT
Start: 2023-08-09 | End: 2023-08-19

## 2023-08-09 NOTE — DISCHARGE INSTRUCTIONS
Tylenol or Motrin as needed for pain or fever. Use the drops as prescribed. Take the amoxicillin as prescribed. Follow-up with your doctor as needed. Return for any concerns.

## 2023-08-09 NOTE — ED INITIAL ASSESSMENT (HPI)
Patient arrives ambulatory with c/o right > left ear pain since Sunday. [place]

## 2023-09-05 RX ORDER — LANSOPRAZOLE 30 MG/1
30 CAPSULE, DELAYED RELEASE ORAL 2 TIMES DAILY
Qty: 180 CAPSULE | Refills: 3 | Status: SHIPPED | OUTPATIENT
Start: 2023-09-05

## 2023-09-05 RX ORDER — LANSOPRAZOLE 30 MG/1
30 CAPSULE, DELAYED RELEASE ORAL 2 TIMES DAILY
Qty: 180 CAPSULE | Refills: 3 | Status: CANCELLED | OUTPATIENT
Start: 2023-09-05

## 2023-09-05 NOTE — TELEPHONE ENCOUNTER
Refill passed per [facility] protocol.     Requested Prescriptions   Pending Prescriptions Disp Refills    LANSOPRAZOLE 30 MG Oral Capsule Delayed Release [Pharmacy Med Name: LANSOPRAZOLE 30MG DR CAPSULES] 180 capsule 1     Sig: TAKE 1 CAPSULE(30 MG) BY MOUTH TWICE DAILY       Gastrointestional Medication Protocol Passed - 9/5/2023  5:45 AM        Passed - In person appointment or virtual visit in the past 12 mos or appointment in next 3 mos     Recent Outpatient Visits              6 months ago Skin tag    [facility], [address], [place], Ada Comfort, DO    Office Visit    11 months ago BALJIT (obstructive sleep apnea)    [address] [facility]    Office Visit    1 year ago BALJIT (obstructive sleep apnea)    [facility], [address], Soledad Meyer MD    Office Visit    1 year ago Epidermal cyst of neck    [facility], [address], Soledad Meyer MD    Office Visit    1 year ago Routine physical examination    [facility], [address], [place], Ada Crocker, DO    Office Visit                           Recent Outpatient Visits              6 months ago Skin tag    [facility], [address], [place] Marvelyn [place], Ada Crocker, DO    Office Visit    11 months ago BALJIT (obstructive sleep apnea)    [address] [facility]    Office Visit    1 year ago BALJIT (obstructive sleep apnea)    [facility], [address], Soledad Meyer MD    Office Visit    1 year ago Epidermal cyst of neck    [facility], Soledad Meyer MD    Office Visit    1 year ago Routine physical examination    [address], [facility] [address], [address] [facility], [facility] Lizzette, DO    Office Visit

## 2024-02-28 ENCOUNTER — HOSPITAL ENCOUNTER (OUTPATIENT)
Age: 39
Discharge: HOME | End: 2024-02-28
Payer: COMMERCIAL

## 2024-02-28 VITALS
SYSTOLIC BLOOD PRESSURE: 121 MMHG | RESPIRATION RATE: 18 BRPM | HEART RATE: 98 BPM | OXYGEN SATURATION: 95 % | TEMPERATURE: 98 F | DIASTOLIC BLOOD PRESSURE: 82 MMHG

## 2024-02-28 DIAGNOSIS — H16.002 CORNEAL ULCER OF LEFT EYE: Primary | ICD-10-CM

## 2024-02-28 PROCEDURE — 99213 OFFICE O/P EST LOW 20 MIN: CPT

## 2024-02-28 RX ORDER — IBUPROFEN 600 MG/1
TABLET ORAL
Qty: 20 TABLET | Refills: 0 | Status: SHIPPED | OUTPATIENT
Start: 2024-02-28

## 2024-02-28 RX ORDER — OFLOXACIN 3 MG/ML
SOLUTION/ DROPS OPHTHALMIC
Qty: 1 EACH | Refills: 0 | Status: SHIPPED | OUTPATIENT
Start: 2024-02-28

## 2024-02-28 RX ORDER — TETRACAINE HYDROCHLORIDE 5 MG/ML
1 SOLUTION OPHTHALMIC ONCE
Status: COMPLETED | OUTPATIENT
Start: 2024-02-28 | End: 2024-02-28

## 2024-02-28 NOTE — ED PROVIDER NOTES
Patient Seen in: [facility] [place]    History     Chief Complaint   Patient presents with    Eye Visual Problem            Stated Complaint: L eye pain    HPI    38-year-old male presents with chief complaint of left eye redness.  Onset this morning.  Patient reports associated foreign body sensation of the left eye.  Patient states that he does wear contact lenses.  Patient states he last wear contact lenses yesterday.  Patient denies injury or trauma, fever, chills, vision changes, diplopia, nausea, vomiting, ocular discharge.    Past Medical History:   Diagnosis Date    Anxiety state     Calculus of kidney     Cellulitis due to MRSA     left knee    Esophageal reflux     Migraines     Nephrolithiasis     cystoscopy    Tonsillitis     Torn meniscus     arthroscopy    Visual impairment     wears contact lenses       Past Surgical History:   Procedure Laterality Date    ARTHROSCOPY OF JOINT UNLISTED Left  - knee    CHOLECYSTECTOMY      COLONOSCOPY N/A 10/29/2021    Procedure: COLONOSCOPY;  Surgeon: Chris Doyle MD;  [facility]: [facility] ENDO    CYSTOSCOPY,INSERT URETERAL STENT      EXC SKIN BENIG 2.1-3CM REMAINDR BODY  2019    EXC SKIN BENIG 2.1-3CM REMAINDR BODY  2022    LAPAROSCOPIC CHOLECYSTECTOMY  2018    REPAIR, COMPLEX, SCALP/ARMS/LEGS, ADD'L 5.0 CM/<  2022    REPR CMPL WND HEAD,FAC,HAND 2.6-7.5  2019            Family History   Problem Relation Age of Onset    Cancer Mother 48        Uterine ca    Ovarian Cancer Mother     Cancer Sister     Genetic Disease Sister         Neurofibromatosis    Breast Cancer Sister     Other (Other) Sister         NERVE SHEATH TUMOR    BRCA gene + Neg         sister       Social History     Socioeconomic History    Marital status:     Number of children: 2   Occupational History    Occupation:    Tobacco Use    Smoking status: Former     Types: Cigarettes     Quit date: 2022     Years since quittin.1     Smokeless tobacco: Never   Vaping Use    Vaping Use: Former   Substance and Sexual Activity    Alcohol use: Yes     Comment: socially    Drug use: No   Other Topics Concern    Caffeine Concern No    Exercise Yes     Comment: [place], twice per week for 1 hour    Left Handed No    Right Handed Yes    Currently spends a great deal of time in the sun No    History of tanning No    Hx of Spending Great Deal of Time in Sun No    Bad sunburns in the past No    Tanning Salons in the Past No    Hx of Radiation Treatments No   Social History Narrative    The patient does not use an assistive device..      The patient does live in a home with stairs.       Review of Systems    Positive for stated complaint: L eye pain  Other systems are as noted in HPI.  Constitutional and vital signs reviewed.      All other systems reviewed and negative except as noted above.    PSFH elements reviewed from today and agreed except as otherwise stated in HPI.    Physical Exam     ED Triage Vitals [02/28/24 1444]   /82   Pulse 98   Resp 18   Temp 97.9 °F (36.6 °C)   Temp src Temporal   SpO2 95 %   O2 Device None (Room air)       Current:/82   Pulse 98   Temp 97.9 °F (36.6 °C) (Temporal)   Resp 18   SpO2 95%     PULSE OX within normal limits on room air as interpreted by this provider.      Right Eye Chart Acuity: 20/20, Corrected  Left Eye Chart Acuity: 20/20, Corrected    Physical Exam    Constitutional: The patient is cooperative. Appears well-developed and well-nourished.  Mild discomfort.  Psychological: Alert, No abnormalities of mood, affect.  Head: Normocephalic/atraumatic.  Eyes: Pupils are equal round reactive to light.  Left conjunctiva injected.  Positive clear tearing present.  No eyelid swelling or erythema.  Nontender to palpation.  Extraocular motions intact bilaterally without reported pain.  No chemosis, hypopyon or hyphema.  A punctate ulcer is present at the 11 o'clock position of the left cornea under  fluorescein examination.  Negative Lexi test.  Everted lids reveal no foreign body.  ENT: Oropharynx is clear.  Neck: The neck is supple.  Nontender.  No meningeal signs.  Respiratory: Respiratory effort was normal.  There is no stridor.  Air entry is equal.  Cardiovascular: Regular rate and rhythm.  Capillary refill is brisk.  No extremity edema.  Genitourinary: Not examined.  Lymphatic: No gross lymphadenopathy noted.  Musculoskeletal: Musculoskeletal system is grossly intact.  There is no obvious deformity.  Neurological: Gross motor movement is intact in all 4 extremities.  Patient exhibits normal speech.  Skin: Skin is normal to inspection.  Warm and dry.  No obvious bruising.  No obvious rash.    ED Course   Labs Reviewed - No data to display  Procedure: Tetracaine ophthalmic drops utilized for anesthesia.  Left eye examined under fluorescein dye.  Findings as documented above.  Patient tolerated procedure well without complication.    MDM     Physical exam remained stable as previously documented.  Physical exam findings discussed with patient.  Ofloxacin ophthalmic drops prescribed.  Patient voices understanding that urgent referral to ophthalmologist is required.    I have given the patient instructions regarding their diagnoses, expectations, follow up, and ER precautions. I explained to the patient that emergent conditions may arise and to go to the ER for new, worsening or any persistent conditions. I've explained the importance of following up with their doctor as instructed. The patient verbalized understanding of the discharge instructions and plan.    Disposition and Plan     Clinical Impression:  1. Corneal ulcer of left eye        Disposition:  Discharge    Follow-up:  Ranjit Sauceda,   [address] [facility]  [address]  [place] [address]  [phone]    Call in 1 day  For follow-up    José Jose MD  [address] [facility]  [address]  [place] [address]  [phone]    Call in 1 day  For  follow-up      Medications Prescribed:  Discharge Medication List as of 2/28/2024  3:08 PM        START taking these medications    Details   ofloxacin 0.3 % Ophthalmic Solution Instill 1 to 2 drops in affected eye every 30 minutes while awake and every 4 to 6 hours at night for the first 2 days; beginning on day 3, instill 1 to 2 drops every hour while awake for 5 additional days; thereafter, 1 to 2 drops 4 times daily until cu re, Normal, Disp-1 each, R-0      ibuprofen 600 MG Oral Tab Take 1 tablet (600 mg total) by mouth every 6 hours with food, Normal, Disp-20 tablet, R-0

## 2024-02-28 NOTE — ED INITIAL ASSESSMENT (HPI)
Patient with left eye redness and irritation upon waking this am.  Reports foreign body sensation as well as light sensitivity.  Patient does wear contact lenses and did wear them yesterday.

## 2024-08-12 ENCOUNTER — OFFICE VISIT (OUTPATIENT)
Dept: FAMILY MEDICINE CLINIC | Facility: CLINIC | Age: 39
End: 2024-08-12
Payer: COMMERCIAL

## 2024-08-12 VITALS
HEART RATE: 106 BPM | RESPIRATION RATE: 18 BRPM | SYSTOLIC BLOOD PRESSURE: 128 MMHG | HEIGHT: 74 IN | BODY MASS INDEX: 37.47 KG/M2 | WEIGHT: 292 LBS | DIASTOLIC BLOOD PRESSURE: 86 MMHG

## 2024-08-12 DIAGNOSIS — L28.0 LICHEN SIMPLEX CHRONICUS: Primary | ICD-10-CM

## 2024-08-12 DIAGNOSIS — F43.0 STRESS REACTION: ICD-10-CM

## 2024-08-12 PROCEDURE — 3008F BODY MASS INDEX DOCD: CPT | Performed by: FAMILY MEDICINE

## 2024-08-12 PROCEDURE — 99214 OFFICE O/P EST MOD 30 MIN: CPT | Performed by: FAMILY MEDICINE

## 2024-08-12 PROCEDURE — 3079F DIAST BP 80-89 MM HG: CPT | Performed by: FAMILY MEDICINE

## 2024-08-12 PROCEDURE — 3074F SYST BP LT 130 MM HG: CPT | Performed by: FAMILY MEDICINE

## 2024-08-12 RX ORDER — CLOTRIMAZOLE AND BETAMETHASONE DIPROPIONATE 10; .64 MG/G; MG/G
CREAM TOPICAL
Qty: 45 G | Refills: 0 | Status: SHIPPED | OUTPATIENT
Start: 2024-08-12

## 2024-08-12 RX ORDER — PAROXETINE 10 MG/1
10 TABLET, FILM COATED ORAL NIGHTLY
Qty: 30 TABLET | Refills: 2 | Status: SHIPPED | OUTPATIENT
Start: 2024-08-12

## 2024-08-12 NOTE — PROGRESS NOTES
Blood pressure 128/86, pulse 106, resp. rate 18, height 6' 2\" (1.88 m), weight 292 lb (132.5 kg).          Patient presents today complaining of a rash left side of the groin very itchy.  Sweats a lot.  Has tried a lot of over-the-counter jock itch creams without relief.    Describes a lot of stress at home and at work.  Wife has substance abuse issues.  Patient has discontinued smoking and drinking.  He reports that he still uses edibles but no other substance abuse.    Objective left groin area with erythematous macular plaque noted    Appropriate mood and affect    Assessment lichen simplex chronicus and stress reaction family dysfunction    Plan couples therapy recommended referral entered also    Lotrisone cream apply to groin twice daily for 1 to 2 weeks    Start paroxetine follow-up in 3 weeks

## 2024-09-03 ENCOUNTER — OFFICE VISIT (OUTPATIENT)
Dept: FAMILY MEDICINE CLINIC | Facility: CLINIC | Age: 39
End: 2024-09-03
Payer: COMMERCIAL

## 2024-09-03 VITALS
HEART RATE: 112 BPM | DIASTOLIC BLOOD PRESSURE: 88 MMHG | WEIGHT: 288 LBS | HEIGHT: 74 IN | RESPIRATION RATE: 17 BRPM | BODY MASS INDEX: 36.96 KG/M2 | SYSTOLIC BLOOD PRESSURE: 130 MMHG

## 2024-09-03 DIAGNOSIS — L28.0 LICHEN SIMPLEX CHRONICUS: Primary | ICD-10-CM

## 2024-09-03 PROCEDURE — 3075F SYST BP GE 130 - 139MM HG: CPT | Performed by: FAMILY MEDICINE

## 2024-09-03 PROCEDURE — 99213 OFFICE O/P EST LOW 20 MIN: CPT | Performed by: FAMILY MEDICINE

## 2024-09-03 PROCEDURE — 3079F DIAST BP 80-89 MM HG: CPT | Performed by: FAMILY MEDICINE

## 2024-09-03 PROCEDURE — 3008F BODY MASS INDEX DOCD: CPT | Performed by: FAMILY MEDICINE

## 2024-09-03 RX ORDER — MOMETASONE FUROATE 1 MG/G
1 CREAM TOPICAL 2 TIMES DAILY PRN
Qty: 45 G | Refills: 0 | Status: SHIPPED | OUTPATIENT
Start: 2024-09-03

## 2024-09-03 RX ORDER — PAROXETINE 20 MG/1
20 TABLET, FILM COATED ORAL NIGHTLY
Qty: 90 TABLET | Refills: 0 | Status: SHIPPED | OUTPATIENT
Start: 2024-09-03

## 2024-09-03 NOTE — PROGRESS NOTES
Blood pressure 130/88, pulse 112, resp. rate 17, height 6' 2\" (1.88 m), weight 288 lb (130.6 kg).          Presents today following up for marital issues and anxiety.  Wife has been using cocaine and alcohol.  Daughter with some difficulty in school.  Patient is sleeping better likes the way the paroxetine works.    Objective    Appropriate mood and affect    Assessment anxiety domestic issues    Plan increase paroxetine follow-up 1 to 2 months encouraged marijuana cessation

## 2024-09-05 RX ORDER — LANSOPRAZOLE 30 MG/1
30 CAPSULE, DELAYED RELEASE ORAL 2 TIMES DAILY
Qty: 180 CAPSULE | Refills: 3 | Status: SHIPPED | OUTPATIENT
Start: 2024-09-05

## 2024-09-05 NOTE — TELEPHONE ENCOUNTER
REFILL PASSED PER [facility] PROTOCOLS    Requested Prescriptions   Pending Prescriptions Disp Refills    LANSOPRAZOLE 30 MG Oral Capsule Delayed Release [Pharmacy Med Name: LANSOPRAZOLE 30MG DR CAPSULES] 180 capsule 3     Sig: TAKE ONE CAPSULE BY MOUTH TWICE DAILY.       Gastrointestional Medication Protocol Passed - 9/3/2024  2:31 PM        Passed - In person appointment or virtual visit in the past 12 mos or appointment in next 3 mos     Recent Outpatient Visits              2 days ago Lichen simplex chronicus    [facility]    [facility] Visit    3 weeks ago [facility]hen [facility]us    [facility]    [facility]    1 year ago [facility]    Office Visit    1 year ago BALJIT (obstructive sleep apnea)    [facility]    Office Visit    2 years ago BALJIT (obstructive sleep apnea)    [facility] Sam Maharaj MD    Office Visit          Future Appointments         Provider Department Appt Notes    In 1 month Ranjit Sauceda,  [facility] [address], [place] Annual physical                         Future Appointments         Provider Department Appt Notes    In 1 month Ranjit Sauceda,  [facility], [address], [place] Annual physical          Recent Outpatient Visits              2 days ago Lichen simplex chronicus    [facility] [place] Cespedes, David B, DO    Office Visit    3 weeks ago Lichen simplex chronicus    [facility]     Office Visit    1 year ago Skin tag    [facility]    Office Visit    1 year ago BALJIT (obstructive sleep apnea)    [facility]    Office Visit    2 years ago BALJIT (obstructive sleep apnea)    [facility] [place] Sam Maharaj MD    Office Visit

## 2024-10-25 ENCOUNTER — OFFICE VISIT (OUTPATIENT)
Dept: FAMILY MEDICINE CLINIC | Facility: CLINIC | Age: 39
End: 2024-10-25
Payer: COMMERCIAL

## 2024-10-25 VITALS
RESPIRATION RATE: 17 BRPM | SYSTOLIC BLOOD PRESSURE: 138 MMHG | HEIGHT: 74 IN | DIASTOLIC BLOOD PRESSURE: 70 MMHG | HEART RATE: 83 BPM | BODY MASS INDEX: 37.35 KG/M2 | WEIGHT: 291 LBS

## 2024-10-25 DIAGNOSIS — Z84.81 FAMILY HISTORY OF BRCA GENE POSITIVE: ICD-10-CM

## 2024-10-25 DIAGNOSIS — Z00.00 ROUTINE PHYSICAL EXAMINATION: Primary | ICD-10-CM

## 2024-10-25 DIAGNOSIS — E78.5 HYPERLIPIDEMIA, UNSPECIFIED HYPERLIPIDEMIA TYPE: ICD-10-CM

## 2024-10-25 DIAGNOSIS — F43.0 STRESS REACTION: ICD-10-CM

## 2024-10-25 RX ORDER — PAROXETINE 40 MG/1
40 TABLET, FILM COATED ORAL NIGHTLY
Qty: 90 TABLET | Refills: 1 | Status: SHIPPED | OUTPATIENT
Start: 2024-10-25

## 2024-10-25 NOTE — PROGRESS NOTES
REASON FOR VISIT:    Johan Rodas is a 39 year old male who presents for an Annual Health Assessment.        Patient Active Problem List   Diagnosis    Right hand pain    Tic disorder    Plantar wart of right foot    Pain of left hip joint    Plantar wart    Blurred vision, left eye    Right ureteral stone    Hydronephrosis of right kidney    Renal colic on right side    Right kidney stone    Grief reaction    Thrush    Biliary colic    Non-recurrent acute serous otitis media of right ear    Acute appendicitis    Acute appendicitis, unspecified acute appendicitis type     General Health     At any time do you feel concerned for the safety/well-being of yourself and/or your children, in your home or elsewhere?: No     CAGE:           Depression Screening (PHQ-2/PHQ-9):  Over the LAST 2 WEEKS             PREVENTATIVE SERVICES  INDICATIONS AND SCHEDULE Recommendation Internal Lab or Procedure   Colonoscopy Screen Every 10 years No recommendations at this time   Flex Sigmoidoscopy Screen  Every 5 years No results found for this or any previous visit.   Fecal Occult Blood  Annually No results found for: \"FOB\", \"OCCULTSTOOL\"   Obesity Screening Screen all adults annually Body mass index is 37.36 kg/m².     Preventive Services for Which Recommendations Vary with Risk Recommendation Internal Lab or Procedure   Cholesterol Screening Recommended screening varies with age, risk and gender LDL Cholesterol (mg/dL)   Date Value   05/15/2021 123 (H)      Diabetes Screening  If history of high blood pressure or other  risk factors No results found for: \"A1C\"  Glucose (mg/dL)   Date Value   05/15/2021 95        Gonorrhea Screening If high risk No results found for: \"GONOCOCCUS\"   HIV Screening For all adults age 18-65, older adults at increased risk No results found for: \"HIV\"   Syphilis Screening Screen if pregnant or high risk No results found for: \"RPR\"   Hepatitis C Screening Screen pts at high risk plus screen one time for  adults born 1945 - 1965 No results found for: \"HCVAB\"   Tuberculosis Screen If high risk No components found for: \"PPDINDURAT\"       SPECIFIC DISEASE MONITORING Internal Lab or Procedure   No disease specific diagnoses    ALLERGIES:   Allergies[1]  CURRENT MEDICATIONS:   Current Outpatient Medications   Medication Sig Dispense Refill    PARoxetine 40 MG Oral Tab Take 1 tablet (40 mg total) by mouth at bedtime. 90 tablet 1    lansoprazole 30 MG Oral Capsule Delayed Release Take 1 capsule (30 mg total) by mouth 2 (two) times daily. 180 capsule 3    Mometasone Furoate 0.1 % External Cream Apply 1 Application topically 2 (two) times daily as needed. 45 g 0    ibuprofen 600 MG Oral Tab Take 1 tablet (600 mg total) by mouth every 6 hours with food 20 tablet 0    Multiple Vitamin (MULTI-VITAMINS) Oral Tab Take by mouth.          MEDICAL INFORMATION:   Past Medical History:    Anxiety state    Calculus of kidney    Cellulitis due to MRSA    left knee    Esophageal reflux    Migraines    Nephrolithiasis    cystoscopy    Tonsillitis    Torn meniscus    arthroscopy    Visual impairment    wears contact lenses      Past Surgical History:   Procedure Laterality Date    Arthroscopy of joint unlisted Left 2009 - knee    Cholecystectomy      Colonoscopy N/A 10/29/2021    Procedure: COLONOSCOPY;  Surgeon: Chris Doyle MD;  Location: [facility]    Cystoscopy,insert ureteral stent  2010    Exc skin benig 2.1-3cm remaindr body  09/16/2019    Exc skin benig 2.1-3cm remaindr body  03/16/2022    Laparoscopic cholecystectomy  11/30/2018    Repair, complex, scalp/arms/legs, add'l 5.0 cm/<  03/16/2022    Repr cmpl wnd head,fac,hand 2.6-7.5  09/16/2019      Family History   Problem Relation Age of Onset    Cancer Mother 48        Uterine ca    Ovarian Cancer Mother     Cancer Sister         BREAST    Genetic Disease Sister         Neurofibromatosis    Breast Cancer Sister     Other (Other) Sister         NERVE SHEATH TUMOR    BRCA gene +  Neg         sister     NO FAMILY HISTORY OF PROSTATE OR COLON CANCER     SOCIAL HISTORY:   Social History     Socioeconomic History    Marital status:     Number of children: 2   Occupational History    Occupation:    Tobacco Use    Smoking status: Former     Current packs/day: 0.00     Types: Cigarettes     Quit date: 2022     Years since quittin.8    Smokeless tobacco: Never   Vaping Use    Vaping status: Former   Substance and Sexual Activity    Alcohol use: Not Currently     Comment: socially    Drug use: No   Other Topics Concern    Caffeine Concern No    Exercise Yes     Comment: Karate, twice per week for 1 hour    Left Handed No    Right Handed Yes    Currently spends a great deal of time in the sun No    History of tanning No    Hx of Spending Great Deal of Time in Sun No    Bad sunburns in the past No    Tanning Salons in the Past No    Hx of Radiation Treatments No   Social History Narrative    The patient does not use an assistive device..      The patient does live in a home with stairs.     Occ:  :       REVIEW OF SYSTEMS:   GENERAL: feels well otherwise  SKIN: denies any unusual skin lesions  EYES: denies blurred vision or double vision  HEENT: denies nasal congestion, sinus pain or ST  LUNGS: denies shortness of breath with exertion  CARDIOVASCULAR: denies chest pain on exertion  GI: denies abdominal pain, denies heartburn  : denies nocturia or changes in stream  MUSCULOSKELETAL: denies back pain  NEURO: denies headaches  PSYCHE: denies depression or anxiety  HEMATOLOGIC: denies hx of anemia  ENDOCRINE: denies thyroid history  ALL/ASTHMA: denies hx of allergy or asthma  NO BLOOD IN STOOL  EXAM:   /70   Pulse 83   Resp 17   Ht 6' 2\" (1.88 m)   Wt 291 lb (132 kg)   BMI 37.36 kg/m²   >   BP Readings from Last 3 Encounters:   10/25/24 138/70   24 130/88   24 128/86        GENERAL: well developed, well nourished, in no apparent distress,  obese  SKIN: no rashes, no suspicious lesions  HEENT: atraumatic, normocephalic, ears and throat are clear  EYES:PERRLA, EOMI, conjunctiva are clear.    NECK: supple, no adenopathy, no bruits  CHEST: no chest tenderness  LUNGS: clear to auscultation  CARDIO: RRR without murmur  GI: good BS's, no masses, HSM or tenderness  : two descended testes, no masses, no hernia, no penile lesions  RECTAL: deferred  MUSCULOSKELETAL: back is not tender, FROM of the back  EXTREMITIES: no cyanosis, clubbing or edema  NEURO: Oriented times three, cranial nerves are intact, motor and sensory are grossly intact         ASSESSMENT AND OTHER RELEVANT CHRONIC CONDITIONS:   Johan Rodas is a 39 year old male who presents for an Annual Health Assessment.     PLAN SUMMARY:   Diagnoses and all orders for this visit:    Routine physical examination    Family history of BRCA gene positive  -     Miscellaneous Testing; Future    Stress reaction    Hyperlipidemia, unspecified hyperlipidemia type  -     AST (SGOT); Future  -     ALT(SGPT); Future  -     Basic Metabolic Panel (8); Future  -     Lipid Panel; Future  -     TSH W Reflex To Free T4; Future    Other orders  -     PARoxetine 40 MG Oral Tab; Take 1 tablet (40 mg total) by mouth at bedtime.       The patient indicates understanding of these issues and agrees to the plan.  No follow-ups on file.  Diet counseling perfomed    SUGGESTED VACCINATIONS - Influenza, Pneumococcal, Zoster, Tetanus, HPV   Influenza: No recommendations at this time  Pneumonia: No recommendations at this time  HPV: No recommendations at this time  Tdap: No recommendations at this time  Shingles:      Influenza Annually   Pneumococcal if high risk   Td/Tdap once then every 10 years   HPV Males 11-21   Zoster (Shingles) 60 and older: one dose   Varicella 2 doses if not immune   MMR 1-2 doses if born after 1958 and not immune     1. Routine physical examination  Refused flu vaccine    2. Family history of BRCA gene  positive  Patient to check regarding out-of-pocket expense    - Miscellaneous Testing; Future    3. Stress reaction    Paroxetine increased patient was some difficulty sleeping    4. Hyperlipidemia, unspecified hyperlipidemia type  Labs ordered  - AST (SGOT); Future  - ALT(SGPT); Future  - Basic Metabolic Panel (8); Future  - Lipid Panel; Future  - TSH W Reflex To Free T4; Future         [1] No Known Allergies

## 2024-11-02 ENCOUNTER — IMMUNIZATION (OUTPATIENT)
Dept: LAB | Age: 39
End: 2024-11-02
Attending: EMERGENCY MEDICINE
Payer: COMMERCIAL

## 2024-11-02 DIAGNOSIS — Z23 NEED FOR VACCINATION: Primary | ICD-10-CM

## 2024-11-02 PROCEDURE — 90656 IIV3 VACC NO PRSV 0.5 ML IM: CPT

## 2024-11-02 PROCEDURE — 90471 IMMUNIZATION ADMIN: CPT

## 2024-11-22 ENCOUNTER — TELEPHONE (OUTPATIENT)
Dept: FAMILY MEDICINE CLINIC | Facility: CLINIC | Age: 39
End: 2024-11-22

## 2024-11-22 NOTE — TELEPHONE ENCOUNTER
Left message to call back; also making aware a [employer] message was sent [1st attempt]    RN Triage - please check if patient read [employer] message and responded, if not please make 2nd attempt to call

## 2024-11-22 NOTE — TELEPHONE ENCOUNTER
patient stated that you increase his Paroxetine dose to 40 mg and he has noticed that for the past week he is having erectile dysfunction. Patient does not want to continue this medication as this is a side effect from this medication. Patient will like to know if he can stop taking this medication or does he needs to taper off it? Please advise.   Also can you sent him a different medication? Please advise.

## 2024-11-25 RX ORDER — PAROXETINE 30 MG/1
30 TABLET, FILM COATED ORAL NIGHTLY
Qty: 30 TABLET | Refills: 2 | Status: SHIPPED | OUTPATIENT
Start: 2024-11-25 | End: 2024-12-02

## 2024-12-02 ENCOUNTER — HOSPITAL ENCOUNTER (OUTPATIENT)
Dept: GENERAL RADIOLOGY | Age: 39
Discharge: HOME | End: 2024-12-02
Attending: FAMILY MEDICINE
Payer: COMMERCIAL

## 2024-12-02 ENCOUNTER — NURSE TRIAGE (OUTPATIENT)
Dept: FAMILY MEDICINE CLINIC | Facility: CLINIC | Age: 39
End: 2024-12-02

## 2024-12-02 ENCOUNTER — OFFICE VISIT (OUTPATIENT)
Dept: FAMILY MEDICINE CLINIC | Facility: CLINIC | Age: 39
End: 2024-12-02
Payer: COMMERCIAL

## 2024-12-02 VITALS
DIASTOLIC BLOOD PRESSURE: 76 MMHG | BODY MASS INDEX: 37.73 KG/M2 | SYSTOLIC BLOOD PRESSURE: 116 MMHG | RESPIRATION RATE: 18 BRPM | HEIGHT: 74 IN | WEIGHT: 294 LBS | HEART RATE: 101 BPM

## 2024-12-02 DIAGNOSIS — M25.521 RIGHT ELBOW PAIN: Primary | ICD-10-CM

## 2024-12-02 DIAGNOSIS — M77.11 LATERAL EPICONDYLITIS OF RIGHT ELBOW: Primary | ICD-10-CM

## 2024-12-02 DIAGNOSIS — M25.521 RIGHT ELBOW PAIN: ICD-10-CM

## 2024-12-02 PROCEDURE — 3078F DIAST BP <80 MM HG: CPT | Performed by: FAMILY MEDICINE

## 2024-12-02 PROCEDURE — 3074F SYST BP LT 130 MM HG: CPT | Performed by: FAMILY MEDICINE

## 2024-12-02 PROCEDURE — 73080 X-RAY EXAM OF ELBOW: CPT | Performed by: FAMILY MEDICINE

## 2024-12-02 PROCEDURE — 3008F BODY MASS INDEX DOCD: CPT | Performed by: FAMILY MEDICINE

## 2024-12-02 PROCEDURE — 99213 OFFICE O/P EST LOW 20 MIN: CPT | Performed by: FAMILY MEDICINE

## 2024-12-02 RX ORDER — VENLAFAXINE HYDROCHLORIDE 37.5 MG/1
37.5 CAPSULE, EXTENDED RELEASE ORAL DAILY
Qty: 30 CAPSULE | Refills: 1 | Status: SHIPPED | OUTPATIENT
Start: 2024-12-02

## 2024-12-02 RX ORDER — PREDNISONE 20 MG/1
TABLET ORAL
Qty: 10 TABLET | Refills: 0 | Status: SHIPPED | OUTPATIENT
Start: 2024-12-02

## 2024-12-02 NOTE — TELEPHONE ENCOUNTER
Action Requested: Summary for Provider     []  Critical Lab, Recommendations Needed  [x] Need Additional Advice  []   FYI    []   Need Orders  [] Need Medications Sent to Pharmacy  []  Other     SUMMARY: Dr Sauceda: patient said he made appointment to see you at 6:50pm today.   But wants to know if you would consider ordering xray before appointment.       Reason for call: Elbow Pain, Right  Onset: a week ago.    Has bad pain for several days.   His friend is chiropractor and examined Right elbow and thought it might be fractured.   Slight swelling; light bruising. Doesn't recall injury.    Patient states his  is limited.     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   Reason for Disposition   Weakness (i.e., loss of strength) in hand or fingers  (Exception: Not truly weak; hand feels weak because of pain.)    Protocols used: Elbow Pain-A-[place]

## 2024-12-02 NOTE — TELEPHONE ENCOUNTER
Spoke with the patient,verified full name and     Informed him of message will proceed to x-ray prior to appointment

## 2024-12-03 NOTE — PROGRESS NOTES
Blood pressure 116/76, pulse 101, resp. rate 18, height 6' 2\" (1.88 m), weight 294 lb (133.4 kg).          Presents today complaining of 1 week of right elbow pain.  Has been seeing a chiropractor.  Was told he should get an x-ray.  No trauma that he can recall.  Has been working construction for about 20 years.  Some tingling down into his hand.    Objective    Tenderness noted right lateral epicondyle also some olecranon and medial epicondyle tenderness    Assessment tendinopathy right elbow patient is right-handed    Plan x-ray reviewed await radiology read    Referral to Occupational Therapy and orthopedics    Printed information given    Patient to obtain counterforce brace    Prednisone for inflammation    Follow-up if no improvement

## 2024-12-19 ENCOUNTER — TELEPHONE (OUTPATIENT)
Dept: FAMILY MEDICINE CLINIC | Facility: CLINIC | Age: 39
End: 2024-12-19

## 2024-12-19 ENCOUNTER — OFFICE VISIT (OUTPATIENT)
Dept: FAMILY MEDICINE CLINIC | Facility: CLINIC | Age: 39
End: 2024-12-19
Payer: COMMERCIAL

## 2024-12-19 VITALS
RESPIRATION RATE: 19 BRPM | HEIGHT: 74 IN | BODY MASS INDEX: 38.63 KG/M2 | WEIGHT: 301 LBS | DIASTOLIC BLOOD PRESSURE: 84 MMHG | HEART RATE: 106 BPM | SYSTOLIC BLOOD PRESSURE: 130 MMHG

## 2024-12-19 DIAGNOSIS — F40.243 FEAR OF FLYING: ICD-10-CM

## 2024-12-19 DIAGNOSIS — N52.9 ERECTILE DYSFUNCTION, UNSPECIFIED ERECTILE DYSFUNCTION TYPE: Primary | ICD-10-CM

## 2024-12-19 PROCEDURE — 3075F SYST BP GE 130 - 139MM HG: CPT | Performed by: FAMILY MEDICINE

## 2024-12-19 PROCEDURE — 99213 OFFICE O/P EST LOW 20 MIN: CPT | Performed by: FAMILY MEDICINE

## 2024-12-19 PROCEDURE — 3079F DIAST BP 80-89 MM HG: CPT | Performed by: FAMILY MEDICINE

## 2024-12-19 PROCEDURE — 3008F BODY MASS INDEX DOCD: CPT | Performed by: FAMILY MEDICINE

## 2024-12-19 RX ORDER — SILDENAFIL 50 MG/1
50 TABLET, FILM COATED ORAL
Qty: 9 TABLET | Refills: 2 | Status: SHIPPED | OUTPATIENT
Start: 2024-12-19

## 2024-12-19 RX ORDER — ALPRAZOLAM 0.25 MG/1
TABLET ORAL
Qty: 4 TABLET | Refills: 0 | Status: SHIPPED | OUTPATIENT
Start: 2024-12-19

## 2024-12-19 NOTE — PROGRESS NOTES
Blood pressure 130/84, pulse 106, resp. rate 19, height 6' 2\" (1.88 m), weight (!) 301 lb (136.5 kg).      Following up for right elbow pain.  Also complaining of erectile dysfunction.    Some fear of flying.  Requesting something to calm him down prior to the flight.    Objective    Comfortable no apparent distress    Assessment #1 erectile dysfunction #2.  Fear of flying #3 elbow tendinopathy    Plan #1 Viagra prescription #2 alprazolam risks and benefits explained #3 follow-up with orthopedics

## 2024-12-21 ENCOUNTER — LAB ENCOUNTER (OUTPATIENT)
Dept: LAB | Age: 39
End: 2024-12-21
Attending: FAMILY MEDICINE
Payer: COMMERCIAL

## 2024-12-21 DIAGNOSIS — N52.9 ERECTILE DYSFUNCTION, UNSPECIFIED ERECTILE DYSFUNCTION TYPE: ICD-10-CM

## 2024-12-21 DIAGNOSIS — E78.5 HYPERLIPIDEMIA, UNSPECIFIED HYPERLIPIDEMIA TYPE: ICD-10-CM

## 2024-12-21 LAB
ALT SERPL-CCNC: 24 U/L
ANION GAP SERPL CALC-SCNC: 4 MMOL/L (ref 0–18)
AST SERPL-CCNC: 19 U/L (ref ?–34)
BUN BLD-MCNC: 14 MG/DL (ref 9–23)
BUN/CREAT SERPL: 18.9 (ref 10–20)
CALCIUM BLD-MCNC: 9.7 MG/DL (ref 8.7–10.4)
CHLORIDE SERPL-SCNC: 107 MMOL/L (ref 98–112)
CHOLEST SERPL-MCNC: 216 MG/DL (ref ?–200)
CO2 SERPL-SCNC: 30 MMOL/L (ref 21–32)
CREAT BLD-MCNC: 0.74 MG/DL
EGFRCR SERPLBLD CKD-EPI 2021: 117 ML/MIN/1.73M2 (ref 60–?)
FASTING PATIENT LIPID ANSWER: YES
FASTING STATUS PATIENT QL REPORTED: YES
GLUCOSE BLD-MCNC: 107 MG/DL (ref 70–99)
HDLC SERPL-MCNC: 50 MG/DL (ref 40–59)
LDLC SERPL CALC-MCNC: 156 MG/DL (ref ?–100)
NONHDLC SERPL-MCNC: 166 MG/DL (ref ?–130)
OSMOLALITY SERPL CALC.SUM OF ELEC: 293 MOSM/KG (ref 275–295)
POTASSIUM SERPL-SCNC: 4.5 MMOL/L (ref 3.5–5.1)
SODIUM SERPL-SCNC: 141 MMOL/L (ref 136–145)
TESTOST SERPL-MCNC: 276.09 NG/DL
TRIGL SERPL-MCNC: 57 MG/DL (ref 30–149)
TSI SER-ACNC: 1.31 UIU/ML (ref 0.55–4.78)
VLDLC SERPL CALC-MCNC: 11 MG/DL (ref 0–30)

## 2024-12-21 PROCEDURE — 84450 TRANSFERASE (AST) (SGOT): CPT

## 2024-12-21 PROCEDURE — 84443 ASSAY THYROID STIM HORMONE: CPT

## 2024-12-21 PROCEDURE — 80061 LIPID PANEL: CPT

## 2024-12-21 PROCEDURE — 80048 BASIC METABOLIC PNL TOTAL CA: CPT

## 2024-12-21 PROCEDURE — 36415 COLL VENOUS BLD VENIPUNCTURE: CPT

## 2024-12-21 PROCEDURE — 84403 ASSAY OF TOTAL TESTOSTERONE: CPT

## 2024-12-21 PROCEDURE — 84460 ALANINE AMINO (ALT) (SGPT): CPT

## 2025-01-31 RX ORDER — VENLAFAXINE HYDROCHLORIDE 37.5 MG/1
37.5 CAPSULE, EXTENDED RELEASE ORAL DAILY
Qty: 30 CAPSULE | Refills: 1 | OUTPATIENT
Start: 2025-01-31

## 2025-01-31 RX ORDER — VENLAFAXINE HYDROCHLORIDE 37.5 MG/1
37.5 CAPSULE, EXTENDED RELEASE ORAL DAILY
Qty: 30 CAPSULE | Refills: 1 | Status: CANCELLED | OUTPATIENT
Start: 2025-01-31

## 2025-02-01 RX ORDER — VENLAFAXINE HYDROCHLORIDE 37.5 MG/1
37.5 CAPSULE, EXTENDED RELEASE ORAL DAILY
Qty: 180 CAPSULE | Refills: 1 | Status: SHIPPED | OUTPATIENT
Start: 2025-02-01

## 2025-02-20 ENCOUNTER — OFFICE VISIT (OUTPATIENT)
Dept: FAMILY MEDICINE CLINIC | Facility: CLINIC | Age: 40
End: 2025-02-20
Payer: COMMERCIAL

## 2025-02-20 VITALS
HEIGHT: 74 IN | WEIGHT: 293 LBS | DIASTOLIC BLOOD PRESSURE: 81 MMHG | HEART RATE: 82 BPM | BODY MASS INDEX: 37.6 KG/M2 | SYSTOLIC BLOOD PRESSURE: 123 MMHG

## 2025-02-20 DIAGNOSIS — H02.9 EYELID LESION: Primary | ICD-10-CM

## 2025-02-20 PROCEDURE — 3079F DIAST BP 80-89 MM HG: CPT | Performed by: FAMILY MEDICINE

## 2025-02-20 PROCEDURE — 3008F BODY MASS INDEX DOCD: CPT | Performed by: FAMILY MEDICINE

## 2025-02-20 PROCEDURE — 99213 OFFICE O/P EST LOW 20 MIN: CPT | Performed by: FAMILY MEDICINE

## 2025-02-20 PROCEDURE — 3074F SYST BP LT 130 MM HG: CPT | Performed by: FAMILY MEDICINE

## 2025-02-20 NOTE — PROGRESS NOTES
Blood pressure 123/81, pulse 82, height 6' 2\" (1.88 m), weight 293 lb (132.9 kg).      Complaining of right eye pain.  Swollen right eyelid.  No known trauma.  He does cut pipe in metal at work.  He wears a face shield.    Objective    Right superior eyelid erythematous with growth noted laterally when eyelid flipped.  PERRLA EOMI    OU 20/20    Assessment inflamed right eyelid    Plan referral to ophthalmology office contacted today to expedite appointment

## 2025-03-13 ENCOUNTER — OFFICE VISIT (OUTPATIENT)
Dept: FAMILY MEDICINE CLINIC | Facility: CLINIC | Age: 40
End: 2025-03-13
Payer: COMMERCIAL

## 2025-03-13 VITALS
HEART RATE: 93 BPM | WEIGHT: 308 LBS | HEIGHT: 74 IN | DIASTOLIC BLOOD PRESSURE: 85 MMHG | BODY MASS INDEX: 39.53 KG/M2 | SYSTOLIC BLOOD PRESSURE: 123 MMHG

## 2025-03-13 DIAGNOSIS — L02.01 FACIAL ABSCESS: Primary | ICD-10-CM

## 2025-03-13 PROCEDURE — 3079F DIAST BP 80-89 MM HG: CPT | Performed by: FAMILY MEDICINE

## 2025-03-13 PROCEDURE — 99213 OFFICE O/P EST LOW 20 MIN: CPT | Performed by: FAMILY MEDICINE

## 2025-03-13 PROCEDURE — 3008F BODY MASS INDEX DOCD: CPT | Performed by: FAMILY MEDICINE

## 2025-03-13 PROCEDURE — 3074F SYST BP LT 130 MM HG: CPT | Performed by: FAMILY MEDICINE

## 2025-03-13 RX ORDER — HYDROCODONE BITARTRATE AND ACETAMINOPHEN 10; 325 MG/1; MG/1
1-2 TABLET ORAL EVERY 8 HOURS PRN
Qty: 15 TABLET | Refills: 0 | Status: SHIPPED | OUTPATIENT
Start: 2025-03-13 | End: 2025-04-12

## 2025-03-13 RX ORDER — CEPHALEXIN 500 MG/1
500 CAPSULE ORAL 4 TIMES DAILY
Qty: 40 CAPSULE | Refills: 0 | Status: SHIPPED | OUTPATIENT
Start: 2025-03-13

## 2025-03-13 NOTE — PROGRESS NOTES
Blood pressure 123/85, pulse 93, height 6' 2\" (1.88 m), weight (!) 308 lb (139.7 kg).      COMPLAINING OF JAW PAIN AND SWELLING FOR THE PAST 2 DAYS.  SOME PURULENT DRAINAGE.  HURTS TO EAT    OBJECTIVE RIGHT MANDIBULAR AREA WITH ERYTHEMATOUS SKIN AND PURULENT DRAINAGE    TENDER    SWOLLEN TENDER ANTERIOR CERVICAL NODE    ASSESSMENT FACIAL CELLULITIS AND ABSCESS    PLAN CEPHALEXIN RX AND NORCO RISKS AND BENEFITS EXPLAINED.   MEDICATION MAY CAUSE DROWSINESS.  DO NOT DRIVE OR OPERATE HEAVY MACHINERY.

## (undated) DIAGNOSIS — M27.40 CYST OF MANDIBLE: ICD-10-CM

## (undated) DEVICE — STERILE LATEX POWDER-FREE SURGICAL GLOVESWITH NITRILE COATING: Brand: PROTEXIS

## (undated) DEVICE — GAMMEX® PI HYBRID SIZE 6.5, STERILE POWDER-FREE SURGICAL GLOVE, POLYISOPRENE AND NEOPRENE BLEND: Brand: GAMMEX

## (undated) DEVICE — COVER SGL STRL LGHT HNDL BLU

## (undated) DEVICE — ENDOSCOPIC VALVE WITH ADAPTER.: Brand: SURSEAL® II

## (undated) DEVICE — SKIN AFFIX .4ML

## (undated) DEVICE — SUCTION CANISTER, 3000CC,SAFELINER: Brand: DEROYAL

## (undated) DEVICE — SOL  .9 1000ML BTL

## (undated) DEVICE — ETS45 RELOAD STANDARD 45MM: Brand: ENDOPATH

## (undated) DEVICE — TROCAR: Brand: KII FIOS FIRST ENTRY

## (undated) DEVICE — UNDYED BRAIDED (POLYGLACTIN 910), SYNTHETIC ABSORBABLE SUTURE: Brand: COATED VICRYL

## (undated) DEVICE — SUTURE VICRYL 0 UR-6

## (undated) DEVICE — DISPOSABLE LAPAROSCOPIC CLIP APPLIER WITH 20 CLIPS.: Brand: EPIX® UNIVERSAL CLIP APPLIER

## (undated) DEVICE — TISSUE RETRIEVAL SYSTEM: Brand: INZII RETRIEVAL SYSTEM

## (undated) DEVICE — TROCARS: Brand: KII® BALLOON BLUNT TIP SYSTEM

## (undated) DEVICE — FORCEP RADIAL JAW 4

## (undated) DEVICE — SOL LACT RINGERS 1000ML

## (undated) DEVICE — LINE MNTR ADLT SET O2 INTMD

## (undated) DEVICE — EYE PADSSTERILENOT MADE WITH NATURAL RUBBER LATEXSINGLE USE ONLYDO NOT USE IF PACKAGE OPENED OR DAMAGED: Brand: CARDINAL HEALTH

## (undated) DEVICE — SOL H2O 3000ML IRRIG

## (undated) DEVICE — MEDI-VAC NON-CONDUCTIVE SUCTION TUBING 6MM X 1.8M (6FT.) L: Brand: CARDINAL HEALTH

## (undated) DEVICE — SOL  .9 1000ML BAG

## (undated) DEVICE — CATH URET CONE TIP 8FR 138008

## (undated) DEVICE — [HIGH FLOW INSUFFLATOR,  DO NOT USE IF PACKAGE IS DAMAGED,  KEEP DRY,  KEEP AWAY FROM SUNLIGHT,  PROTECT FROM HEAT AND RADIOACTIVE SOURCES.]: Brand: PNEUMOSURE

## (undated) DEVICE — SUTURE MONOCRYL 4-0 Y845G

## (undated) DEVICE — NITINOL STONE RETRIEVAL BASKET: Brand: ZERO TIP

## (undated) DEVICE — LUBRICANT JLY SURGILUBE 2OZ

## (undated) DEVICE — Device

## (undated) DEVICE — SOL  .9 3000ML

## (undated) DEVICE — ENCORE® PERRY STYLE 42 PF SIZE 7.5, STERILE LATEX POWDER-FREE SURGICAL GLOVE: Brand: ENCORE

## (undated) DEVICE — BAG DRAIN INFECTION CNTRL 2000

## (undated) DEVICE — HEAD & NECK: Brand: MEDLINE INDUSTRIES, INC.

## (undated) DEVICE — CASED DISP BIPOLAR CORD

## (undated) DEVICE — UROLOGY DRAIN BAG STERILE

## (undated) DEVICE — TOWEL OR BLU 16X26 STRL

## (undated) DEVICE — PLASTC TOOMEY SYRNG DISP

## (undated) DEVICE — ENDOPATH ETS-FLEX45 ARTICULATING ENDOSCOPIC LINEAR CUTTER, NO RELOAD: Brand: ENDOPATH

## (undated) DEVICE — 5 MM BABCOCKS WITH RATCHET HANDLES: Brand: ENDOPATH

## (undated) DEVICE — TRAY SKIN PREP PVP-1

## (undated) DEVICE — LAP CHOLE: Brand: MEDLINE INDUSTRIES, INC.

## (undated) DEVICE — KIT CLEAN ENDOKIT 1.1OZ GOWNX2

## (undated) DEVICE — SUTURE PDS II 4-0 PS-2

## (undated) DEVICE — NITINOL WIRE STR 035

## (undated) DEVICE — TROCAR: Brand: KII® SLEEVE

## (undated) DEVICE — Device: Brand: DEFENDO AIR/WATER/SUCTION AND BIOPSY VALVE

## (undated) DEVICE — 12 ML SYRINGE LUER-LOCK TIP: Brand: MONOJECT

## (undated) DEVICE — ENDOPATH ETS45 2.5MM RELOADS (VASCULAR/THIN): Brand: ENDOPATH

## (undated) DEVICE — 1010 S-DRAPE TOWEL DRAPE 10/BX: Brand: STERI-DRAPE™

## (undated) DEVICE — FLEXOR, URETERAL ACCESS SHEATH WITH AQ, HYDROPHILIC COATING: Brand: FLEXOR

## (undated) DEVICE — CAUTERY BLADE 2IN INS E1455

## (undated) DEVICE — CYSTO PACK: Brand: MEDLINE INDUSTRIES, INC.

## (undated) DEVICE — SOL H2O 1000ML BTL

## (undated) DEVICE — ENCORE® LATEX ACCLAIM SIZE 8, STERILE LATEX POWDER-FREE SURGICAL GLOVE: Brand: ENCORE

## (undated) DEVICE — ISOVUE 300 10X100ML VIAL

## (undated) DEVICE — SUTURE PLAIN GUT 5-0 PC-1

## (undated) DEVICE — MATTRESS PT HOVERMATT 1/2L 34W

## (undated) DEVICE — NITINOL WIRE ANGLED 035

## (undated) DEVICE — PUMP SAPS 1  ACT 1 WY VLV

## (undated) DEVICE — ABDOMINAL BINDER: Brand: DEROYAL

## (undated) NOTE — LETTER
11/27/18        Patient: Estela Ledesma   YOB: 1985   Date of Visit: 11/27/2018       Dear  Dr. Kimber Zamarripa,      Thank you for referring Estela Ledesma to my practice. Please find my assessment and plan below.

## (undated) NOTE — ED AVS SNAPSHOT
[facility] AND [facility] in [address] [facility]  [address] [address]    Phone:  [phone]    Fax:  [phone]           Mr. Yue Boogie   MRN: [id number]    Department:  [facility] AND [facility] in [address] [place] [address]   Date of Visi STRAINS AND SPRAINS, TREATING (ENGLISH)      Disclosure     Insurance plans vary and the physician(s) referred by the Immediate Care may not be covered by your plan. It is possible that the physician may not participate in your health insurance plan.   Bernett Scheuermann IF THERE IS ANY CHANGE OR WORSENING OF YOUR CONDITION, CALL YOUR PRIMARY CARE PHYSICIAN AT ONCE OR GO TO THE EMERGENCY DEPARTMENT.     If you have been prescribed any medication(s), please fill your prescription right away and begin taking the medication(s) harming yourself, contact [facility] at [phone]. - If you don’t have insurance, Amy Comer has partnered with Patient [address] to help you get signed up for insurance coverage.   Patient [place]

## (undated) NOTE — LETTER
1/29/2018                [address] [place]        Dear Tonya Valente should do the 24 urine for oxalate and citrate and schedule appointment to see me; takes at least 10 days for us to get results.

## (undated) NOTE — LETTER
No referring provider defined for this encounter. 11/21/17        Patient: Leeann Avila   YOB: 1985   Date of Visit: 11/21/2017       Dear  Dr. Elsie Vernon, DO,      Thank you for referring Leeann Avila to my practice. and decided that if he remains relatively stable, he wants to avoid any urology surgical procedures tomorrow; pt states if he becomes significantly ill from stone during the night, then he we would  proceed with cystoscopy, right retrograde pyelogram x-ray

## (undated) NOTE — ED AVS SNAPSHOT
Mr. Isreal Jiang   MRN: [id number]    Department:  [facility] Emergency Department   Date of Visit:  11/17/2017           Disclosure     Insurance plans vary and the physician(s) referred by the ER may not be covered by your plan.  Please co CARE PHYSICIAN AT ONCE OR RETURN IMMEDIATELY TO THE EMERGENCY DEPARTMENT. If you have been prescribed any medication(s), please fill your prescription right away and begin taking the medication(s) as directed.   If you believe that any of the medications

## (undated) NOTE — LETTER
10/18/2021        To whom it may concern:    Lurdes Pitts is under my medical care and was seen related to a change in bowel habits, abdominal cramping, and unintentional weight loss.   His lab work and imaging studies have been unremarkable to this

## (undated) NOTE — LETTER
10/06/21        [address] [facility]      Dear Cuauhtemoc Strickland,    [facility] records indicate that you have outstanding lab work and or testing that was ordered for you and has not yet been completed:     IMMUNOGLOBULIN A, QUANT   TI

## (undated) NOTE — LETTER
Germania Ludwig  [place] [address] [facility], [address]       08/22/19        Patient: Sanjay Bayron   YOB: 1985   Date of Visit: 8/22/2019       Dear  Dr. Phuong Arrieta,      Thank you for referring Sanjay Verma to my

## (undated) NOTE — LETTER
[facility]  Administration of Anesthesia  1. Irma Hamilton, or _________________________________ acting on his behalf, (Patient) (Dependent/Representative) request to receive anesthesia for my pending procedure/operation/treatment. bleeding, seizure, cardiac arrest and death. 7. AWARENESS: I understand that it is possible (but unlikely) to have explicit memory of events from the operating room while under general anesthesia.   8. ELECTROCONVULSIVE THERAPY PATIENTS: This consent serve below affirms that prior to the time of the procedure, I have explained to the patient and/or his/her guardian, the risks and benefits of undergoing anesthesia, as well as any reasonable alternatives.     ___________________________________________________